# Patient Record
Sex: FEMALE | HISPANIC OR LATINO | Employment: FULL TIME | URBAN - METROPOLITAN AREA
[De-identification: names, ages, dates, MRNs, and addresses within clinical notes are randomized per-mention and may not be internally consistent; named-entity substitution may affect disease eponyms.]

---

## 2018-03-12 ENCOUNTER — OFFICE VISIT (OUTPATIENT)
Dept: RETAIL CLINIC | Facility: CLINIC | Age: 28
End: 2018-03-12

## 2018-03-12 VITALS
SYSTOLIC BLOOD PRESSURE: 135 MMHG | RESPIRATION RATE: 18 BRPM | OXYGEN SATURATION: 98 % | TEMPERATURE: 98.5 F | DIASTOLIC BLOOD PRESSURE: 85 MMHG | HEART RATE: 71 BPM

## 2018-03-12 DIAGNOSIS — H10.9 CONJUNCTIVITIS OF BOTH EYES, UNSPECIFIED CONJUNCTIVITIS TYPE: Primary | ICD-10-CM

## 2018-03-12 DIAGNOSIS — J30.89 ACUTE ALLERGIC RHINITIS DUE TO OTHER ALLERGEN, UNSPECIFIED SEASONALITY: ICD-10-CM

## 2018-03-12 PROCEDURE — 99203 OFFICE O/P NEW LOW 30 MIN: CPT | Performed by: NURSE PRACTITIONER

## 2018-03-12 RX ORDER — POLYMYXIN B SULFATE AND TRIMETHOPRIM 1; 10000 MG/ML; [USP'U]/ML
1 SOLUTION OPHTHALMIC EVERY 6 HOURS
Qty: 10 ML | Refills: 0 | Status: SHIPPED | OUTPATIENT
Start: 2018-03-12 | End: 2018-03-22

## 2018-03-12 RX ORDER — CETIRIZINE HYDROCHLORIDE 10 MG/1
10 TABLET ORAL DAILY
COMMUNITY
End: 2018-03-12 | Stop reason: ALTCHOICE

## 2018-03-12 RX ORDER — FEXOFENADINE HYDROCHLORIDE 60 MG/1
60 TABLET, FILM COATED ORAL DAILY
Qty: 30 TABLET | Refills: 0 | Status: SHIPPED | OUTPATIENT
Start: 2018-03-12 | End: 2018-04-11

## 2018-03-12 NOTE — PATIENT INSTRUCTIONS
Bacterial Conjunctivitis  Bacterial conjunctivitis is an infection of the clear membrane that covers the white part of your eye and the inner surface of your eyelid (conjunctiva). When the blood vessels in your conjunctiva become inflamed, your eye becomes red or pink, and it will probably feel itchy. Bacterial conjunctivitis spreads very easily from person to person (is contagious). It also spreads easily from one eye to the other eye.  What are the causes?  This condition is caused by several common bacteria. You may get the infection if you come into close contact with another person who is infected. You may also come into contact with items that are contaminated with the bacteria, such as a face towel, contact lens solution, or eye makeup.  What increases the risk?  This condition is more likely to develop in people who:  · Are exposed to other people who have the infection.  · Wear contact lenses.  · Have a sinus infection.  · Have had a recent eye injury or surgery.  · Have a weak body defense system (immune system).  · Have a medical condition that causes dry eyes.  What are the signs or symptoms?  Symptoms of this condition include:  · Eye redness.  · Tearing or watery eyes.  · Itchy eyes.  · Burning feeling in your eyes.  · Thick, yellowish discharge from an eye. This may turn into a crust on the eyelid overnight and cause your eyelids to stick together.  · Swollen eyelids.  · Blurred vision.  How is this diagnosed?  Your health care provider can diagnose this condition based on your symptoms and medical history. Your health care provider may also take a sample of discharge from your eye to find the cause of your infection. This is rarely done.  How is this treated?  Treatment for this condition includes:  · Antibiotic eye drops or ointment to clear the infection more quickly and prevent the spread of infection to others.  · Oral antibiotic medicines to treat infections that do not respond to drops or  ointments, or last longer than 10 days.  · Cool, wet cloths (cool compresses) placed on the eyes.  · Artificial tears applied 2-6 times a day.  Follow these instructions at home:  Medicines   · Take or apply your antibiotic medicine as told by your health care provider. Do not stop taking or applying the antibiotic even if you start to feel better.  · Take or apply over-the-counter and prescription medicines only as told by your health care provider.  · Be very careful to avoid touching the edge of your eyelid with the eye drop bottle or the ointment tube when you apply medicines to the affected eye. This will keep you from spreading the infection to your other eye or to other people.  Managing discomfort   · Gently wipe away any drainage from your eye with a warm, wet washcloth or a cotton ball.  · Apply a cool, clean washcloth to your eye for 10-20 minutes, 3-4 times a day.  General instructions   · Do not wear contact lenses until the inflammation is gone and your health care provider says it is safe to wear them again. Ask your health care provider how to sterilize or replace your contact lenses before you use them again. Wear glasses until you can resume wearing contacts.  · Avoid wearing eye makeup until the inflammation is gone. Throw away any old eye cosmetics that may be contaminated.  · Change or wash your pillowcase every day.  · Do not share towels or washcloths. This may spread the infection.  · Wash your hands often with soap and water. Use paper towels to dry your hands.  · Avoid touching or rubbing your eyes.  · Do not drive or use heavy machinery if your vision is blurred.  Contact a health care provider if:  · You have a fever.  · Your symptoms do not get better after 10 days.  Get help right away if:  · You have a fever and your symptoms suddenly get worse.  · You have severe pain when you move your eye.  · You have facial pain, redness, or swelling.  · You have sudden loss of vision.  This  information is not intended to replace advice given to you by your health care provider. Make sure you discuss any questions you have with your health care provider.  Document Released: 12/18/2006 Document Revised: 04/27/2017 Document Reviewed: 09/29/2016  [employer] Interactive Patient Education © 2017 [employer]

## 2018-03-12 NOTE — PROGRESS NOTES
Subjective   Shira Antoine is a 28 y.o. female.     Eye symptoms began 3 weeks ago but allergy symptoms are intermittent for the last few months. She was taking Claritin without symptom improvement and within the last month started taking zyrtec which doesn't seem to have helped eye or nasal symptoms either. She is a transporter in the hospital. Has vision tested 11/2017 and new glasses prescription at that time. Reports blurred vision intermittently in the left eye.       Conjunctivitis    The current episode started more than 2 weeks ago. The onset was gradual. The problem occurs continuously. The problem has been unchanged. The problem is moderate. Nothing relieves the symptoms. Exacerbated by: lubricating eye drops. Associated symptoms include decreased vision (blurry), eye itching, congestion, rhinorrhea, eye discharge and eye redness. Pertinent negatives include no fever, no double vision, no photophobia, no abdominal pain, no constipation, no diarrhea, no nausea, no vomiting, no ear discharge, no ear pain, no headaches, no hearing loss, no mouth sores, no sore throat, no stridor, no swollen glands, no muscle aches, no neck pain, no cough, no wheezing, no rash and no eye pain. There were no sick contacts. She has received no recent medical care.       The following portions of the patient's history were reviewed and updated as appropriate: allergies, current medications, past family history, past medical history, past social history, past surgical history and problem list.    Review of Systems   Constitutional: Negative for appetite change, chills, diaphoresis, fatigue and fever.   HENT: Positive for congestion, postnasal drip and rhinorrhea. Negative for ear discharge, ear pain, facial swelling, hearing loss, mouth sores, nosebleeds, sinus pressure, sneezing, sore throat, trouble swallowing and voice change.    Eyes: Positive for discharge, redness, itching and visual disturbance. Negative for double  vision, photophobia and pain.   Respiratory: Negative for cough, chest tightness, shortness of breath, wheezing and stridor.    Cardiovascular: Negative for chest pain and palpitations.   Gastrointestinal: Negative for abdominal pain, constipation, diarrhea, nausea and vomiting.   Genitourinary: Negative for decreased urine volume.   Musculoskeletal: Negative for myalgias, neck pain and neck stiffness.   Skin: Negative for rash.   Allergic/Immunologic: Positive for environmental allergies. Negative for immunocompromised state.   Neurological: Negative for dizziness, syncope, weakness and headaches.       Objective   Physical Exam   Constitutional: She is oriented to person, place, and time. She appears well-developed and well-nourished. She is cooperative.  Non-toxic appearance. She does not appear ill. No distress.   HENT:   Right Ear: Hearing, external ear and ear canal normal. Tympanic membrane is retracted. Tympanic membrane is not scarred, not perforated, not erythematous and not bulging. A middle ear effusion is present.   Left Ear: Hearing, external ear and ear canal normal. Tympanic membrane is retracted. Tympanic membrane is not scarred, not perforated, not erythematous and not bulging. A middle ear effusion is present.   Nose: Nose normal. Right sinus exhibits no maxillary sinus tenderness and no frontal sinus tenderness. Left sinus exhibits no maxillary sinus tenderness and no frontal sinus tenderness.   Mouth/Throat: Uvula is midline, oropharynx is clear and moist and mucous membranes are normal. Mucous membranes are not pale, not dry and not cyanotic. No oral lesions. No uvula swelling. No oropharyngeal exudate, posterior oropharyngeal edema or posterior oropharyngeal erythema. Tonsils are 1+ on the right. Tonsils are 1+ on the left. No tonsillar exudate.   Eyes: EOM and lids are normal. Pupils are equal, round, and reactive to light. Right eye exhibits discharge (yellow, thick, small amount). Right eye  exhibits no hordeolum. No foreign body present in the right eye. Left eye exhibits discharge (yellow, thick, small amount). Left eye exhibits no hordeolum. No foreign body present in the left eye. Right conjunctiva is not injected. Right conjunctiva has no hemorrhage. Left conjunctiva is injected. Left conjunctiva has no hemorrhage.   Visual acuity:  Both 20/10  Right 20/15  Left 20/100  Tested wearing glasses   Cardiovascular: Normal rate, regular rhythm, S1 normal and S2 normal.    Pulmonary/Chest: Effort normal and breath sounds normal.   Lymphadenopathy:     She has no cervical adenopathy.   Neurological: She is alert and oriented to person, place, and time.   Skin: Skin is warm and dry. She is not diaphoretic. No pallor.   Vitals reviewed.      Assessment/Plan   Shira was seen today for conjunctivitis.    Diagnoses and all orders for this visit:    Conjunctivitis of both eyes, unspecified conjunctivitis type  -     trimethoprim-polymyxin b (POLYTRIM) 79961-5.1 UNIT/ML-% ophthalmic solution; Administer 1 drop to both eyes Every 6 (Six) Hours for 10 days.    Acute allergic rhinitis due to other allergen, unspecified seasonality  -     fexofenadine (ALLEGRA ALLERGY) 60 MG tablet; Take 1 tablet by mouth Daily for 30 days.          -     Stop zyrtec        -     Follow up with PCP or optometrist for persistent or worsening symptoms

## 2018-11-13 ENCOUNTER — APPOINTMENT (OUTPATIENT)
Dept: CT IMAGING | Facility: HOSPITAL | Age: 28
End: 2018-11-13

## 2018-11-13 ENCOUNTER — HOSPITAL ENCOUNTER (EMERGENCY)
Facility: HOSPITAL | Age: 28
Discharge: HOME | End: 2018-11-13
Attending: EMERGENCY MEDICINE | Admitting: EMERGENCY MEDICINE

## 2018-11-13 VITALS
OXYGEN SATURATION: 99 % | HEIGHT: 64 IN | HEART RATE: 72 BPM | SYSTOLIC BLOOD PRESSURE: 139 MMHG | RESPIRATION RATE: 16 BRPM | WEIGHT: 145 LBS | DIASTOLIC BLOOD PRESSURE: 89 MMHG | TEMPERATURE: 98.9 F | BODY MASS INDEX: 24.75 KG/M2

## 2018-11-13 DIAGNOSIS — R51.9 ACUTE NONINTRACTABLE HEADACHE, UNSPECIFIED HEADACHE TYPE: ICD-10-CM

## 2018-11-13 DIAGNOSIS — R42 DIZZINESS: Primary | ICD-10-CM

## 2018-11-13 LAB
ALBUMIN SERPL-MCNC: 3.8 G/DL (ref 3.5–5.2)
ALBUMIN/GLOB SERPL: 0.8 G/DL
ALP SERPL-CCNC: 49 U/L (ref 39–117)
ALT SERPL W P-5'-P-CCNC: 12 U/L (ref 1–33)
ANION GAP SERPL CALCULATED.3IONS-SCNC: 11.2 MMOL/L
AST SERPL-CCNC: 17 U/L (ref 1–32)
BASOPHILS # BLD AUTO: 0.01 10*3/MM3 (ref 0–0.2)
BASOPHILS NFR BLD AUTO: 0.2 % (ref 0–1.5)
BILIRUB SERPL-MCNC: 0.2 MG/DL (ref 0.1–1.2)
BILIRUB UR QL STRIP: NEGATIVE
BUN BLD-MCNC: 18 MG/DL (ref 6–20)
BUN/CREAT SERPL: 14.1 (ref 7–25)
CALCIUM SPEC-SCNC: 9.3 MG/DL (ref 8.6–10.5)
CHLORIDE SERPL-SCNC: 104 MMOL/L (ref 98–107)
CLARITY UR: CLEAR
CO2 SERPL-SCNC: 25.8 MMOL/L (ref 22–29)
COLOR UR: YELLOW
CREAT BLD-MCNC: 1.28 MG/DL (ref 0.57–1)
DEPRECATED RDW RBC AUTO: 40.4 FL (ref 37–54)
EOSINOPHIL # BLD AUTO: 0.23 10*3/MM3 (ref 0–0.7)
EOSINOPHIL NFR BLD AUTO: 5.3 % (ref 0.3–6.2)
ERYTHROCYTE [DISTWIDTH] IN BLOOD BY AUTOMATED COUNT: 12.2 % (ref 11.7–13)
GFR SERPL CREATININE-BSD FRML MDRD: 49 ML/MIN/1.73
GLOBULIN UR ELPH-MCNC: 4.6 GM/DL
GLUCOSE BLD-MCNC: 86 MG/DL (ref 65–99)
GLUCOSE UR STRIP-MCNC: NEGATIVE MG/DL
HCT VFR BLD AUTO: 37.1 % (ref 35.6–45.5)
HGB BLD-MCNC: 12.2 G/DL (ref 11.9–15.5)
HGB UR QL STRIP.AUTO: NEGATIVE
HOLD SPECIMEN: NORMAL
HOLD SPECIMEN: NORMAL
IMM GRANULOCYTES # BLD: 0.01 10*3/MM3 (ref 0–0.03)
IMM GRANULOCYTES NFR BLD: 0.2 % (ref 0–0.5)
KETONES UR QL STRIP: NEGATIVE
LEUKOCYTE ESTERASE UR QL STRIP.AUTO: NEGATIVE
LYMPHOCYTES # BLD AUTO: 1.49 10*3/MM3 (ref 0.9–4.8)
LYMPHOCYTES NFR BLD AUTO: 34.6 % (ref 19.6–45.3)
MCH RBC QN AUTO: 29.8 PG (ref 26.9–32)
MCHC RBC AUTO-ENTMCNC: 32.9 G/DL (ref 32.4–36.3)
MCV RBC AUTO: 90.5 FL (ref 80.5–98.2)
MONOCYTES # BLD AUTO: 0.49 10*3/MM3 (ref 0.2–1.2)
MONOCYTES NFR BLD AUTO: 11.4 % (ref 5–12)
NEUTROPHILS # BLD AUTO: 2.09 10*3/MM3 (ref 1.9–8.1)
NEUTROPHILS NFR BLD AUTO: 48.5 % (ref 42.7–76)
NITRITE UR QL STRIP: NEGATIVE
PH UR STRIP.AUTO: 7 [PH] (ref 5–8)
PLATELET # BLD AUTO: 268 10*3/MM3 (ref 140–500)
PMV BLD AUTO: 9.6 FL (ref 6–12)
POTASSIUM BLD-SCNC: 3.8 MMOL/L (ref 3.5–5.2)
PROT SERPL-MCNC: 8.4 G/DL (ref 6–8.5)
PROT UR QL STRIP: NEGATIVE
RBC # BLD AUTO: 4.1 10*6/MM3 (ref 3.9–5.2)
SODIUM BLD-SCNC: 141 MMOL/L (ref 136–145)
SP GR UR STRIP: 1.01 (ref 1–1.03)
UROBILINOGEN UR QL STRIP: NORMAL
WBC NRBC COR # BLD: 4.31 10*3/MM3 (ref 4.5–10.7)
WHOLE BLOOD HOLD SPECIMEN: NORMAL
WHOLE BLOOD HOLD SPECIMEN: NORMAL

## 2018-11-13 PROCEDURE — 81003 URINALYSIS AUTO W/O SCOPE: CPT | Performed by: NURSE PRACTITIONER

## 2018-11-13 PROCEDURE — 25010000002 KETOROLAC TROMETHAMINE PER 15 MG: Performed by: EMERGENCY MEDICINE

## 2018-11-13 PROCEDURE — 99283 EMERGENCY DEPT VISIT LOW MDM: CPT

## 2018-11-13 PROCEDURE — 25010000002 PROCHLORPERAZINE EDISYLATE PER 10 MG: Performed by: EMERGENCY MEDICINE

## 2018-11-13 PROCEDURE — 85025 COMPLETE CBC W/AUTO DIFF WBC: CPT | Performed by: NURSE PRACTITIONER

## 2018-11-13 PROCEDURE — 96375 TX/PRO/DX INJ NEW DRUG ADDON: CPT

## 2018-11-13 PROCEDURE — 96374 THER/PROPH/DIAG INJ IV PUSH: CPT

## 2018-11-13 PROCEDURE — 70450 CT HEAD/BRAIN W/O DYE: CPT

## 2018-11-13 PROCEDURE — 80053 COMPREHEN METABOLIC PANEL: CPT | Performed by: NURSE PRACTITIONER

## 2018-11-13 PROCEDURE — 36415 COLL VENOUS BLD VENIPUNCTURE: CPT

## 2018-11-13 RX ORDER — SODIUM CHLORIDE 0.9 % (FLUSH) 0.9 %
10 SYRINGE (ML) INJECTION AS NEEDED
Status: DISCONTINUED | OUTPATIENT
Start: 2018-11-13 | End: 2018-11-13 | Stop reason: HOSPADM

## 2018-11-13 RX ORDER — KETOROLAC TROMETHAMINE 30 MG/ML
15 INJECTION, SOLUTION INTRAMUSCULAR; INTRAVENOUS ONCE
Status: COMPLETED | OUTPATIENT
Start: 2018-11-13 | End: 2018-11-13

## 2018-11-13 RX ADMIN — SODIUM CHLORIDE 1000 ML: 9 INJECTION, SOLUTION INTRAVENOUS at 18:42

## 2018-11-13 RX ADMIN — KETOROLAC TROMETHAMINE 15 MG: 30 INJECTION, SOLUTION INTRAMUSCULAR at 18:45

## 2018-11-13 RX ADMIN — PROCHLORPERAZINE EDISYLATE 10 MG: 5 INJECTION INTRAMUSCULAR; INTRAVENOUS at 18:45

## 2018-11-13 NOTE — ED TRIAGE NOTES
Pt reports dizziness, room spinning sensation, pressure behind her ears and n/v constant x1 week. States took naproxen and Dramamine, denies relief

## 2018-11-13 NOTE — ED PROVIDER NOTES
EMERGENCY DEPARTMENT ENCOUNTER    CHIEF COMPLAINT  Chief Complaint: dizziness  History given by: patient  History limited by: nothing  Room Number: 39/39  PMD: Provider, No Known      HPI:  Pt is a 28 y.o. female who presents complaining of intermittent dizziness over the last 4 days. Pt describes the dizziness as the room spinning. Pt states that her dizziness is worse with movement, light and loud noises. Pt also complains of bilateral parietal HA and N/V but denies cough, congestion, recent illness, SOA, abd pain, diarrhea or additional complaint. Pt denies having similar symptoms previously. Pt denies having a history of HAs. Pt states that she has taken Naproxen and dramamine with transient relief of her symptoms.     Duration:  4 days  Onset: gradual  Timing: intermittent  Location: generalized  Radiation: N/A  Quality: room spinning  Intensity/Severity: moderate to severe  Progression: worsening  Associated Symptoms: HA, N/V  Aggravating Factors: movement, light, loud noises  Alleviating Factors: none  Previous Episodes: Pt denies having similar symptoms previously.  Treatment before arrival:  Pt states that she has taken Naproxen and dramamine with transient relief of her symptoms.    PAST MEDICAL HISTORY  Active Ambulatory Problems     Diagnosis Date Noted   • No Active Ambulatory Problems     Resolved Ambulatory Problems     Diagnosis Date Noted   • No Resolved Ambulatory Problems     Past Medical History:   Diagnosis Date   • Allergic        PAST SURGICAL HISTORY  History reviewed. No pertinent surgical history.    FAMILY HISTORY  History reviewed. No pertinent family history.    SOCIAL HISTORY  Social History     Socioeconomic History   • Marital status: Single     Spouse name: Not on file   • Number of children: Not on file   • Years of education: Not on file   • Highest education level: Not on file   Social Needs   • Financial resource strain: Not on file   • Food insecurity - worry: Not on file   •  Food insecurity - inability: Not on file   • Transportation needs - medical: Not on file   • Transportation needs - non-medical: Not on file   Occupational History   • Not on file   Tobacco Use   • Smoking status: Former Smoker   • Smokeless tobacco: Never Used   Substance and Sexual Activity   • Alcohol use: Yes     Comment: rarely   • Drug use: No   • Sexual activity: Defer   Other Topics Concern   • Not on file   Social History Narrative   • Not on file       ALLERGIES  Patient has no known allergies.    REVIEW OF SYSTEMS  Review of Systems   Constitutional: Negative for fever.   HENT: Negative for sore throat.    Eyes: Negative.    Respiratory: Negative for cough and shortness of breath.    Cardiovascular: Negative for chest pain.   Gastrointestinal: Positive for nausea and vomiting. Negative for abdominal pain and diarrhea.   Genitourinary: Negative for dysuria.   Musculoskeletal: Negative for neck pain.   Skin: Negative for rash.   Allergic/Immunologic: Negative.    Neurological: Positive for dizziness and headaches. Negative for weakness and numbness.   Hematological: Negative.    Psychiatric/Behavioral: Negative.    All other systems reviewed and are negative.      PHYSICAL EXAM  ED Triage Vitals   Temp Heart Rate Resp BP SpO2   11/13/18 1612 11/13/18 1612 11/13/18 1659 11/13/18 1659 11/13/18 1612   98.9 °F (37.2 °C) 80 14 134/97 100 %      Temp src Heart Rate Source Patient Position BP Location FiO2 (%)   11/13/18 1612 -- 11/13/18 1659 -- --   Tympanic  Lying         Physical Exam   Constitutional: She is oriented to person, place, and time. No distress.   HENT:   Head: Normocephalic and atraumatic.   Right Ear: Tympanic membrane normal.   Left Ear: Tympanic membrane normal.   Eyes: EOM are normal. Pupils are equal, round, and reactive to light.   Neck: Normal range of motion. Neck supple.   Cardiovascular: Normal rate, regular rhythm and normal heart sounds.   Pulmonary/Chest: Effort normal and breath  sounds normal. No respiratory distress.   Abdominal: Soft. There is no tenderness. There is no rebound and no guarding.   Musculoskeletal: Normal range of motion. She exhibits no edema.   Neurological: She is alert and oriented to person, place, and time. She has normal sensation and normal strength.   Skin: Skin is warm and dry. No rash noted.   Psychiatric: Mood and affect normal.   Nursing note and vitals reviewed.      LAB RESULTS  Lab Results (last 24 hours)     Procedure Component Value Units Date/Time    CBC & Differential [[id number]] Collected:  11/13/18 1703    Specimen:  Blood Updated:  11/13/18 1720    Narrative:       The following orders were created for panel order CBC & Differential.  Procedure                               Abnormality         Status                     ---------                               -----------         ------                     CBC Auto Differential[[id number]]        Abnormal            Final result                 Please view results for these tests on the individual orders.    Comprehensive Metabolic Panel [[id number]]  (Abnormal) Collected:  11/13/18 1703    Specimen:  Blood Updated:  11/13/18 1812     Glucose 86 mg/dL      BUN 18 mg/dL      Creatinine 1.28 mg/dL      Sodium 141 mmol/L      Potassium 3.8 mmol/L      Chloride 104 mmol/L      CO2 25.8 mmol/L      Calcium 9.3 mg/dL      Total Protein 8.4 g/dL      Albumin 3.80 g/dL      ALT (SGPT) 12 U/L      AST (SGOT) 17 U/L      Alkaline Phosphatase 49 U/L      Total Bilirubin 0.2 mg/dL      eGFR Non African Amer 49 mL/min/1.73      Globulin 4.6 gm/dL      A/G Ratio 0.8 g/dL      BUN/Creatinine Ratio 14.1     Anion Gap 11.2 mmol/L     CBC Auto Differential [[id number]]  (Abnormal) Collected:  11/13/18 1703    Specimen:  Blood Updated:  11/13/18 1720     WBC 4.31 10*3/mm3      RBC 4.10 10*6/mm3      Hemoglobin 12.2 g/dL      Hematocrit 37.1 %      MCV 90.5 fL      MCH 29.8 pg      MCHC 32.9 g/dL      RDW 12.2 %      RDW-SD  "40.4 fl      MPV 9.6 fL      Platelets 268 10*3/mm3      Neutrophil % 48.5 %      Lymphocyte % 34.6 %      Monocyte % 11.4 %      Eosinophil % 5.3 %      Basophil % 0.2 %      Immature Grans % 0.2 %      Neutrophils, Absolute 2.09 10*3/mm3      Lymphocytes, Absolute 1.49 10*3/mm3      Monocytes, Absolute 0.49 10*3/mm3      Eosinophils, Absolute 0.23 10*3/mm3      Basophils, Absolute 0.01 10*3/mm3      Immature Grans, Absolute 0.01 10*3/mm3     Urinalysis With Microscopic If Indicated (No Culture) - Urine, Clean Catch [[id number]]  (Normal) Collected:  11/13/18 1902    Specimen:  Urine, Clean Catch Updated:  11/13/18 1911     Color, UA Yellow     Appearance, UA Clear     pH, UA 7.0     Specific Gravity, UA 1.008     Glucose, UA Negative     Ketones, UA Negative     Bilirubin, UA Negative     Blood, UA Negative     Protein, UA Negative     Leuk Esterase, UA Negative     Nitrite, UA Negative     Urobilinogen, UA 0.2 E.U./dL    Narrative:       Urine microscopic not indicated.          I ordered the above labs and reviewed the results    RADIOLOGY  CT Head Without Contrast   Final Result   Negative.        This report was finalized on 11/13/2018 6:00 PM by Dr. Gui Cabrera M.D.               I ordered the above noted radiological studies. Interpreted by radiologist. Reviewed by me in PACS.       PROCEDURES  Procedures      PROGRESS AND CONSULTS  ED Course as of Nov 13 2004 Tue Nov 13, 2018   1711 Pt c/o intermittent dizziness (\"room spinning\") that started about 4 days ago after she awoke from sleep. Is exacerbated by walking and is temporarily alleviated with dramamine. Reports also having nausea, vomiting, and headache. Denies focal weakness, numbness, and pain and difficulty with urination.    Documentation assistance provided by joslyn Negron for FLACO Méndez. Information recorded by the joslyn was done at my direction and has been verified and validated by me.   [MS]      ED Course User Index  [MS] " JoditristaGricelda R, APRN     1827- Notified pt of her lab and imaging results, including her negative CT Head. Notified pt that her symptoms could be due to a migraine HA. Discussed the plan to order medication for her HA. Pt understands and agrees with the plan, all questions answered.    1830- Ordered compazine and toradol for HA and IVF for hydration.    2001- Rechecked pt. Pt is resting comfortably and states that her symptoms have improved. Notified pt of her unremarkable lab and imaging results, including her negative CT Head. Discussed the plan to discharge the pt home. I instructed the pt to rest and follow up with her PCP. Pt understands and agrees with the plan, all questions answered.      MEDICAL DECISION MAKING  Results were reviewed/discussed with the patient and they were also made aware of online access. Pt also made aware that some labs, such as cultures, will not be resulted during ER visit and follow up with PMD is necessary.     MDM  Number of Diagnoses or Management Options  Acute nonintractable headache, unspecified headache type:   Dizziness:      Amount and/or Complexity of Data Reviewed  Clinical lab tests: ordered and reviewed (UA is negative)  Tests in the radiology section of CPT®: ordered and reviewed (CT head shows nothing acute)  Independent visualization of images, tracings, or specimens: yes    Patient Progress  Patient progress: stable          DIAGNOSIS  Final diagnoses:   Dizziness   Acute nonintractable headache, unspecified headache type       DISPOSITION  DISCHARGE    Patient discharged in stable condition.    Reviewed implications of results, diagnosis, meds, responsibility to follow up, warning signs and symptoms of possible worsening, potential complications and reasons to return to ER.    Patient/Family voiced understanding of above instructions.    Discussed plan for discharge, as there is no emergent indication for admission. Patient referred to primary care provider  for BP management due to today's BP. Pt/family is agreeable and understands need for follow up and repeat testing.  Pt is aware that discharge does not mean that nothing is wrong but it indicates no emergency is present that requires admission and they must continue care with follow-up as given below or physician of their choice.     FOLLOW-UP  PATIENT [facility] [id number]  [phone]  Schedule an appointment as soon as possible for a visit            Medication List      No changes were made to your prescriptions during this visit.         Latest Documented Vital Signs:  As of 8:04 PM  BP- 142/97 HR- 70 Temp- 98.9 °F (37.2 °C) (Tympanic) O2 sat- 100%    --  Documentation assistance provided by joslyn Macdonald for Dr. Teixeira.  Information recorded by the scribe was done at my direction and has been verified and validated by me.     Macey Macdonald  11/13/18 2004       Kevyn Teixeira MD  11/14/18 [id number]

## 2018-11-21 ENCOUNTER — HOSPITAL ENCOUNTER (EMERGENCY)
Facility: HOSPITAL | Age: 28
Discharge: HOME | End: 2018-11-21
Attending: EMERGENCY MEDICINE | Admitting: EMERGENCY MEDICINE

## 2018-11-21 VITALS
HEART RATE: 88 BPM | WEIGHT: 145 LBS | RESPIRATION RATE: 18 BRPM | BODY MASS INDEX: 24.75 KG/M2 | SYSTOLIC BLOOD PRESSURE: 127 MMHG | OXYGEN SATURATION: 98 % | DIASTOLIC BLOOD PRESSURE: 81 MMHG | TEMPERATURE: 98.2 F | HEIGHT: 64 IN

## 2018-11-21 DIAGNOSIS — T78.40XA ALLERGIC REACTION, INITIAL ENCOUNTER: Primary | ICD-10-CM

## 2018-11-21 DIAGNOSIS — T78.3XXA ALLERGIC ANGIOEDEMA, INITIAL ENCOUNTER: ICD-10-CM

## 2018-11-21 PROCEDURE — 96375 TX/PRO/DX INJ NEW DRUG ADDON: CPT

## 2018-11-21 PROCEDURE — 25010000002 EPINEPHRINE PER 0.1 MG: Performed by: EMERGENCY MEDICINE

## 2018-11-21 PROCEDURE — 99283 EMERGENCY DEPT VISIT LOW MDM: CPT

## 2018-11-21 PROCEDURE — 25010000002 METHYLPREDNISOLONE PER 125 MG: Performed by: EMERGENCY MEDICINE

## 2018-11-21 PROCEDURE — 63710000001 DIPHENHYDRAMINE PER 50 MG: Performed by: EMERGENCY MEDICINE

## 2018-11-21 PROCEDURE — 96372 THER/PROPH/DIAG INJ SC/IM: CPT

## 2018-11-21 PROCEDURE — 96374 THER/PROPH/DIAG INJ IV PUSH: CPT

## 2018-11-21 RX ORDER — METHYLPREDNISOLONE SODIUM SUCCINATE 125 MG/2ML
125 INJECTION, POWDER, LYOPHILIZED, FOR SOLUTION INTRAMUSCULAR; INTRAVENOUS ONCE
Status: COMPLETED | OUTPATIENT
Start: 2018-11-21 | End: 2018-11-21

## 2018-11-21 RX ORDER — EPINEPHRINE 1 MG/ML
0.3 INJECTION, SOLUTION, CONCENTRATE INTRAVENOUS ONCE
Status: COMPLETED | OUTPATIENT
Start: 2018-11-21 | End: 2018-11-21

## 2018-11-21 RX ORDER — PREDNISONE 20 MG/1
20 TABLET ORAL 2 TIMES DAILY
Qty: 10 TABLET | Refills: 0 | Status: SHIPPED | OUTPATIENT
Start: 2018-11-21 | End: 2020-03-23

## 2018-11-21 RX ORDER — DIPHENHYDRAMINE HCL 25 MG
25 CAPSULE ORAL EVERY 6 HOURS PRN
Status: DISCONTINUED | OUTPATIENT
Start: 2018-11-21 | End: 2018-11-21 | Stop reason: HOSPADM

## 2018-11-21 RX ORDER — FAMOTIDINE 10 MG/ML
20 INJECTION, SOLUTION INTRAVENOUS ONCE
Status: COMPLETED | OUTPATIENT
Start: 2018-11-21 | End: 2018-11-21

## 2018-11-21 RX ORDER — SODIUM CHLORIDE 0.9 % (FLUSH) 0.9 %
10 SYRINGE (ML) INJECTION AS NEEDED
Status: DISCONTINUED | OUTPATIENT
Start: 2018-11-21 | End: 2018-11-21 | Stop reason: HOSPADM

## 2018-11-21 RX ADMIN — EPINEPHRINE 0.3 MG: 1 INJECTION, SOLUTION, CONCENTRATE INTRAVENOUS at 18:34

## 2018-11-21 RX ADMIN — DIPHENHYDRAMINE HYDROCHLORIDE 25 MG: 25 CAPSULE ORAL at 18:28

## 2018-11-21 RX ADMIN — METHYLPREDNISOLONE SODIUM SUCCINATE 125 MG: 125 INJECTION, POWDER, FOR SOLUTION INTRAMUSCULAR; INTRAVENOUS at 18:28

## 2018-11-21 RX ADMIN — FAMOTIDINE 20 MG: 10 INJECTION, SOLUTION INTRAVENOUS at 18:29

## 2018-11-21 NOTE — ED PROVIDER NOTES
EMERGENCY DEPARTMENT ENCOUNTER    CHIEF COMPLAINT  Chief Complaint: Rash  History given by: Pt  History limited by: Nothing  Room Number: 12/12  PMD: Provider, No Known      HPI:  Pt is a 28 y.o. female with a hx of allergies who presents complaining of pruritic allergic reaction starting approximately 35 minutes PTA. Pt reports she rash initially began on her L upper arm, but has since spread to her face, chest and arms. Pt denies SOA, difficulty swallowing. She reports she has been off her allergy shots for the last month and has not been taking zyrtec for the last couple of weeks.     Duration:  35 minutes PTA  Onset: gradual  Timing: constant  Location: LUE, chest, face  Radiation: originally began on left upper arm  Quality: rash  Intensity/Severity: moderate  Progression: worsening  Associated Symptoms: facial swelling  Aggravating Factors: none  Alleviating Factors: none  Previous Episodes: Pt has a hx of allergies  Treatment before arrival: Pt reports she has not been taking zyrtec or receiving allergy shots recently    PAST MEDICAL HISTORY  Active Ambulatory Problems     Diagnosis Date Noted   • No Active Ambulatory Problems     Resolved Ambulatory Problems     Diagnosis Date Noted   • No Resolved Ambulatory Problems     Past Medical History:   Diagnosis Date   • Allergic        PAST SURGICAL HISTORY  No past surgical history on file.    FAMILY HISTORY  No family history on file.    SOCIAL HISTORY  Social History     Socioeconomic History   • Marital status: Single     Spouse name: Not on file   • Number of children: Not on file   • Years of education: Not on file   • Highest education level: Not on file   Social Needs   • Financial resource strain: Not on file   • Food insecurity - worry: Not on file   • Food insecurity - inability: Not on file   • Transportation needs - medical: Not on file   • Transportation needs - non-medical: Not on file   Occupational History   • Not on file   Tobacco Use   •  Smoking status: Former Smoker   • Smokeless tobacco: Never Used   Substance and Sexual Activity   • Alcohol use: Yes     Comment: rarely   • Drug use: No   • Sexual activity: Defer   Other Topics Concern   • Not on file   Social History Narrative   • Not on file       ALLERGIES  Patient has no known allergies.    REVIEW OF SYSTEMS  Review of Systems   Constitutional: Negative for fever.   HENT: Positive for facial swelling. Negative for sore throat and trouble swallowing.    Eyes: Negative.    Respiratory: Negative for cough and shortness of breath.    Cardiovascular: Negative for chest pain.   Gastrointestinal: Negative for abdominal pain, diarrhea and vomiting.   Genitourinary: Negative for dysuria.   Musculoskeletal: Negative for neck pain.   Skin: Positive for rash.   Allergic/Immunologic: Negative.    Neurological: Negative for weakness, numbness and headaches.   Hematological: Negative.    Psychiatric/Behavioral: Negative.    All other systems reviewed and are negative.      PHYSICAL EXAM  ED Triage Vitals   Temp Heart Rate Resp BP SpO2   11/21/18 1808 11/21/18 1808 11/21/18 1808 11/21/18 1827 11/21/18 1808   98.2 °F (36.8 °C) 92 20 (!) 129/102 100 %      Temp src Heart Rate Source Patient Position BP Location FiO2 (%)   11/21/18 1808 11/21/18 1808 11/21/18 1827 11/21/18 1827 --   Tympanic Monitor Sitting Right arm          Physical Exam   Constitutional: She is oriented to person, place, and time. No distress.   HENT:   Head: Normocephalic and atraumatic.   Mouth/Throat: Uvula swelling (mild) present.   There is angioedema of the periorbital area of her face and L lip. She is tolerating her own secretions and able to swallow.    Eyes: EOM are normal. Pupils are equal, round, and reactive to light.   Neck: Normal range of motion. Neck supple.   Cardiovascular: Normal rate, regular rhythm and normal heart sounds.   Pulmonary/Chest: Effort normal and breath sounds normal. No respiratory distress.   Abdominal:  Soft. There is no tenderness. There is no rebound and no guarding.   Musculoskeletal: Normal range of motion. She exhibits no edema.   Neurological: She is alert and oriented to person, place, and time. She has normal sensation and normal strength.   Skin: Skin is warm and dry. No rash noted.   There is urticaria on the left arm, bilateral shoulders, and anterior chest. No involvement of the legs, abdomen, or hands.    Psychiatric: Mood and affect normal.   Nursing note and vitals reviewed.        PROCEDURES  Procedures      PROGRESS AND CONSULTS       1820  Ordered Benadryl, Pepcid, Epinephrine, and solu-medrol.     1911 - Rechecked pt. Pt's hives have greatly diminished, and there is still mild angioedema around the left eye. She reports she feels better, but does report feeling a little itchy.     2025  Pt recheck. Pt's hives are gone and angioedema is down a lot. Pt is resting in bed comfortably and is ready to go home. Discussed the plan to discharge pt home with prescription for Deltasone. I instructed the pt to f/u with Patient Liaison [place].  Pt understands and agrees with plan, all concerns addressed.      MEDICAL DECISION MAKING  Results were reviewed/discussed with the patient and they were also made aware of online access. Pt also made aware that some labs, such as cultures, will not be resulted during ER visit and follow up with PMD is necessary.     MDM       DIAGNOSIS  Final diagnoses:   Allergic reaction, initial encounter   Allergic angioedema, initial encounter       DISPOSITION  DISCHARGE    Patient discharged in stable condition.    Reviewed implications of results, diagnosis, meds, responsibility to follow up, warning signs and symptoms of possible worsening, potential complications and reasons to return to ER.    Patient/Family voiced understanding of above instructions.    Discussed plan for discharge, as there is no emergent indication for admission. Patient referred to primary care  provider for BP management due to today's BP. Pt/family is agreeable and understands need for follow up and repeat testing.  Pt is aware that discharge does not mean that nothing is wrong but it indicates no emergency is present that requires admission and they must continue care with follow-up as given below or physician of their choice.     FOLLOW-UP  PATIENT LIAISON [facility] [id number]  [phone]  Schedule an appointment as soon as possible for a visit            Medication List      New Prescriptions    predniSONE 20 MG tablet  Commonly known as:  DELTASONE  Take 1 tablet by mouth 2 (Two) Times a Day.              Latest Documented Vital Signs:  As of 8:43 PM  BP- 127/81 HR- 88 Temp- 98.2 °F (36.8 °C) (Tympanic) O2 sat- 98%    --  Documentation assistance provided by joslyn Juan and Rahat Mckeon for Dr. Escobar.  Information recorded by the scribe was done at my direction and has been verified and validated by me.     Edgar Juan  11/21/18 2010       Rahat Mckeon  11/21/18 2043       Kevin Escobar MD  11/21/18 2771

## 2018-11-22 NOTE — DISCHARGE INSTRUCTIONS
Use over the counter Pepcid twice a day for the next five days.  Use prednisone as directed.  Continue the home zyrtec.  Please return to the ED if symptoms return or if you develop trouble breathing.

## 2019-06-14 ENCOUNTER — HOSPITAL ENCOUNTER (EMERGENCY)
Facility: HOSPITAL | Age: 29
Discharge: HOME | End: 2019-06-14
Attending: EMERGENCY MEDICINE | Admitting: EMERGENCY MEDICINE

## 2019-06-14 VITALS
WEIGHT: 135 LBS | DIASTOLIC BLOOD PRESSURE: 110 MMHG | RESPIRATION RATE: 16 BRPM | BODY MASS INDEX: 23.05 KG/M2 | OXYGEN SATURATION: 100 % | HEART RATE: 72 BPM | HEIGHT: 64 IN | SYSTOLIC BLOOD PRESSURE: 154 MMHG | TEMPERATURE: 98.6 F

## 2019-06-14 DIAGNOSIS — T45.0X5A ALLERGY INJECTION REACTION, INITIAL ENCOUNTER: Primary | ICD-10-CM

## 2019-06-14 DIAGNOSIS — T80.89XA ALLERGY INJECTION REACTION, INITIAL ENCOUNTER: Primary | ICD-10-CM

## 2019-06-14 PROCEDURE — 99282 EMERGENCY DEPT VISIT SF MDM: CPT

## 2019-08-06 ENCOUNTER — TRANSCRIBE ORDERS (OUTPATIENT)
Dept: OCCUPATIONAL THERAPY | Facility: CLINIC | Age: 29
End: 2019-08-06

## 2019-08-06 DIAGNOSIS — S46.911D STRAIN OF RIGHT SHOULDER, SUBSEQUENT ENCOUNTER: Primary | ICD-10-CM

## 2019-08-07 ENCOUNTER — TREATMENT (OUTPATIENT)
Dept: PHYSICAL THERAPY | Facility: CLINIC | Age: 29
End: 2019-08-07

## 2019-08-07 DIAGNOSIS — S46.911D STRAIN OF RIGHT SHOULDER, SUBSEQUENT ENCOUNTER: Primary | ICD-10-CM

## 2019-08-07 PROCEDURE — 97162 PT EVAL MOD COMPLEX 30 MIN: CPT | Performed by: PHYSICAL THERAPIST

## 2019-08-07 PROCEDURE — 97014 ELECTRIC STIMULATION THERAPY: CPT | Performed by: PHYSICAL THERAPIST

## 2019-08-07 PROCEDURE — 97110 THERAPEUTIC EXERCISES: CPT | Performed by: PHYSICAL THERAPIST

## 2019-08-07 PROCEDURE — 97140 MANUAL THERAPY 1/> REGIONS: CPT | Performed by: PHYSICAL THERAPIST

## 2019-08-07 NOTE — PROGRESS NOTES
Physical Therapy Initial Evaluation and Plan of Care    Patient: Shira Robbins   : 1990  Diagnosis/ICD-10 Code:  Strain of right shoulder, subsequent encounter [S46.911D]  Referring practitioner: MARLA Monroy    Subjective Evaluation    History of Present Illness  Date of onset: 2019  Mechanism of injury: Transferring patient from stretcher to bed - Patient slid between the bed and stretcher, had to maintain outstretched position for patient safety until bed  could be repositioned   Felt like she overstretched shoulder and noticed pain in anterior shoulder, right wrist and left knee (struck the bed frame)  Self treated over weekend  BW on following Monday - [facility]  Now referred to PT for shoulder    - 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        Patient Occupation: [employer] - [employer] - 17 months Pain  Current pain ratin  At best pain ratin  At worst pain ratin  Location: popping in the posterior shoulder, tightness in the anterior shoulder, pulling in entire shoulder   Quality: tight, dull ache, knife-like and sharp  Relieving factors: support, rest, heat and medications  Aggravating factors: outstretched reach, overhead activity and lifting (let arm hang down)  Progression: worsening    Hand dominance: left    Diagnostic Tests  No diagnostic tests performed    Treatments  Previous treatment: medication  Current treatment: medication and physical therapy  Patient Goals  Patient goal: BE able to do all of her normal activities            Objective       Postural Observations  Seated posture: fair  Standing posture: fair    Additional Postural Observation Details  Slight protraction of shoulders  Holding right arm in sling position     Palpation     Right Tenderness of the anterior deltoid, biceps, posterior deltoid, supraspinatus, triceps and upper trapezius.     Tenderness     Right Shoulder  Tenderness in the biceps tendon (proximal), bicipital groove  and supraspinatus tendon.     Cervical/Thoracic Screen   Cervical range of motion within normal limits  Thoracic range of motion within normal limits    Active Range of Motion     Right Shoulder   Flexion: 80 degrees   Extension: 60 degrees   Abduction: 112 degrees   External rotation 90°: 80 degrees  Internal rotation 90°: 80 degrees     Passive Range of Motion     Right Shoulder   Flexion: 170 degrees   Abduction: 170 degrees     Joint Play     Right Shoulder  Joints within functional limits are the anterior capsule. Hypermobile in the posterior capsule.     Strength/Myotome Testing     Right Shoulder     Planes of Motion   Flexion: 4-   Extension: 4+   Abduction: 4-   External rotation at 0°: 4   Internal rotation at 0°: 4     Additional Strength Details  Guarding with isometric testing of all shoulder movements    Tests     Right Shoulder   Positive empty can, painful arc, Speed's and sulcus sign.   Negative drop arm.     Additional Tests Details  Slight increased laxity noted with sulcus signs but also noted on the left UE         Assessment & Plan     Assessment  Impairments: abnormal muscle firing, abnormal or restricted ROM, activity intolerance, impaired physical strength, lacks appropriate home exercise program, pain with function and safety issue  Assessment details: 29 y.o. Female with right shoulder pain after transferring a heavy patient presents with: 1. Constant right shoulder pain, 2. Over immobilization of the RUE, 3. Decreased active ROM of the right shoulder, 4. Tenderness to palpation throughout the right shoulder and biceps muscle, 5. Muscle guarding prevents true strength testing but it is at least 4/5 throughout, 6. Pain with testing of Empty Can and Speeds test, 7. Reported frequent popping but none elicit today, 8. 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  Patient will have functional pain free shoulder AROM  Patient will be able to simulated patient transfers without increased symptoms  Patient will return to work with min/no restrictions        Plan  Therapy options: will be seen for skilled physical therapy services  Planned modality interventions: cryotherapy and electrical stimulation/Russian stimulation  Other planned modality interventions: Positional taping  Planned therapy interventions: manual therapy, joint mobilization, strengthening, stretching and home exercise program  Frequency: 3x week  Duration in visits: 12  Duration in weeks: 4  Treatment plan discussed with: patient  Plan details: Patient issued written HEP of exercises performed in clinic today          Manual Therapy:    14     mins  04736;  Therapeutic Exercise:    20     mins  21587;     Neuromuscular Luis:    0    mins  07149;    Therapeutic Activity:     5     mins  37538;   Probable causes of symptoms, rehab process    Evaluation Time:     25  mins  Timed Treatment:   39   mins   Total Treatment:     90   mins    PT SIGNATURE: Vane Salgado, PT   DATE TREATMENT INITIATED: 8/7/2019    Initial Certification  Certification Period: 11/5/2019  I certify that the therapy services are furnished while this patient is under my care.  The services outlined above are required by this patient, and will be reviewed every 90 days.     PHYSICIAN: Dick Brennan PA _________________________________       DATE: ________________________    Please sign and return via fax to [phone].. Thank  you, [facility].

## 2019-08-08 ENCOUNTER — TREATMENT (OUTPATIENT)
Dept: PHYSICAL THERAPY | Facility: CLINIC | Age: 29
End: 2019-08-08

## 2019-08-08 DIAGNOSIS — S46.911D STRAIN OF RIGHT SHOULDER, SUBSEQUENT ENCOUNTER: Primary | ICD-10-CM

## 2019-08-08 PROCEDURE — 97140 MANUAL THERAPY 1/> REGIONS: CPT | Performed by: PHYSICAL THERAPIST

## 2019-08-08 PROCEDURE — 97110 THERAPEUTIC EXERCISES: CPT | Performed by: PHYSICAL THERAPIST

## 2019-08-08 PROCEDURE — 97014 ELECTRIC STIMULATION THERAPY: CPT | Performed by: PHYSICAL THERAPIST

## 2019-08-08 NOTE — PROGRESS NOTES
Physical Therapy Daily Progress Note    VISIT#: 2    Subjective   Shira Robbins reports: less soreness in the shoulder in general but still painful with quick movements.  Reports compliance to HEP.      Objective   Presents with arm in more relaxed posiitoin than initial visit.     See Exercise, Manual, and Modality Logs for complete treatment.     Patient Education: Purpose of specific exercises    Assessment/Plan  Less noted guarding in the shoulder.  Still with pain and occasional popping but neither as bad as yesterday.     Progress strengthening /stabilization /functional activity           Manual Therapy:    14     mins  22905;  Therapeutic Exercise:    25/31     mins  31506;     Neuromuscular Luis:    0    mins  74096;    Therapeutic Activity:     0     mins  09617;       Timed Treatment:   39   mins   Total Treatment:     65   mins    Vane Salgado, PT  [place] License # [id number]  Physical Therapist

## 2019-08-12 ENCOUNTER — TREATMENT (OUTPATIENT)
Dept: PHYSICAL THERAPY | Facility: CLINIC | Age: 29
End: 2019-08-12

## 2019-08-12 DIAGNOSIS — S46.911D STRAIN OF RIGHT SHOULDER, SUBSEQUENT ENCOUNTER: Primary | ICD-10-CM

## 2019-08-12 PROCEDURE — 97140 MANUAL THERAPY 1/> REGIONS: CPT | Performed by: PHYSICAL THERAPIST

## 2019-08-12 PROCEDURE — 97110 THERAPEUTIC EXERCISES: CPT | Performed by: PHYSICAL THERAPIST

## 2019-08-12 PROCEDURE — 97014 ELECTRIC STIMULATION THERAPY: CPT | Performed by: PHYSICAL THERAPIST

## 2019-08-12 NOTE — PROGRESS NOTES
Physical Therapy Daily Progress Note    VISIT#: 3    Subjective   Shira Robbins reports: that she has decreased pain compared to her initial status.  States that she was able to use the arm a bit more funcioinally this weekend.       Objective   Full passive motion    Slightly painful arc in active flexion    See Exercise, Manual, and Modality Logs for complete treatment.     Patient Education: need for stabilization exercises.    Assessment/Plan  Patient with decreased pain and increased motion since initial status. Will assess for work readiness next visit.    Progress strengthening /stabilization /functional activity           Manual Therapy:    14     mins  93863;  Therapeutic Exercise:    25/35     mins  93176;     Neuromuscular Luis:    0    mins  40880;    Therapeutic Activity:     0     mins  44114;       Timed Treatment:   39   mins   Total Treatment:     90   mins    Vane Salgado, PT  [place] License # [id number]  Physical Therapist

## 2019-08-13 ENCOUNTER — TREATMENT (OUTPATIENT)
Dept: PHYSICAL THERAPY | Facility: CLINIC | Age: 29
End: 2019-08-13

## 2019-08-13 DIAGNOSIS — S46.911D STRAIN OF RIGHT SHOULDER, SUBSEQUENT ENCOUNTER: Primary | ICD-10-CM

## 2019-08-13 PROCEDURE — 97014 ELECTRIC STIMULATION THERAPY: CPT | Performed by: PHYSICAL THERAPIST

## 2019-08-13 PROCEDURE — 97530 THERAPEUTIC ACTIVITIES: CPT | Performed by: PHYSICAL THERAPIST

## 2019-08-13 PROCEDURE — 97110 THERAPEUTIC EXERCISES: CPT | Performed by: PHYSICAL THERAPIST

## 2019-08-13 NOTE — PROGRESS NOTES
MD Letter - Reassessment    Date of Initial Visit: Type: THERAPY  Noted: 8/7/2019  Today's Date: 8/13/2019  Patient seen for 4 sessions    Treatment has included: therapeutic exercise, neuromuscular re-education, manual therapy, electrical stimulation, cryotherapy and positional taping    Subjective   Shira states that she has more functional use of the arm but has some pain with quick or twisting type movements.  She reports that the pain is in the lateral subacromial space when present.  States that her pain now varies between 0-3/10 compared to 4-8/10 initially.      Objective - she presents moving her arm in fluid movement patterns compared to holding it in the sling position upon her initial visit  Shoulder AROM - full and pain free in all plane of motion  Strength - 4+/5 in flexion and abduction due to apprehension, 5/5 in all other planes  Sensation - intact  Palpation - slight tenderness to palpation in the lateral subacromial space  Special tests - slight weakness but no pain noted with both Speeds and Empty Can tests, negative Paz Slick and Sulcus sign equal to left shoulder  Activity tolerances - she is able to simulate a patient transfer from supine to sit to stand with only minimal pain in the shoulder.  She is able to push 100# without pain but had mild discomfort with pulling the 100#.     Assessment/Plan  Patient has demonstrated signficant improvement since the initiation of therapy.  The pain has  Decreased in both frequency and intensity.  The motion has returend to full.  The activity tolerances have increased but not quite to work demand level.  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  If you have any questions concerning the care, please do not hesitate to contact me.          PT Signature: Vane Salgado, PT        Manual Therapy:    0     mins  85870;  Therapeutic Exercise:    25/40      mins  65552;     Neuromuscular Luis:    0    mins  65748;    Therapeutic Activity:     12     mins  88335;   Assessed for MD and work sim    Timed Treatment:   37   mins   Total Treatment:     65   mins

## 2019-08-15 ENCOUNTER — TREATMENT (OUTPATIENT)
Dept: PHYSICAL THERAPY | Facility: CLINIC | Age: 29
End: 2019-08-15

## 2019-08-15 DIAGNOSIS — S46.911D STRAIN OF RIGHT SHOULDER, SUBSEQUENT ENCOUNTER: Primary | ICD-10-CM

## 2019-08-15 PROCEDURE — 97140 MANUAL THERAPY 1/> REGIONS: CPT | Performed by: PHYSICAL THERAPIST

## 2019-08-15 PROCEDURE — 97110 THERAPEUTIC EXERCISES: CPT | Performed by: PHYSICAL THERAPIST

## 2019-08-15 PROCEDURE — 97014 ELECTRIC STIMULATION THERAPY: CPT | Performed by: PHYSICAL THERAPIST

## 2019-08-15 NOTE — PROGRESS NOTES
Physical Therapy Daily Progress Note    VISIT#: 5    Subjective   Shira Robbins reports: that she has some tightness in the posterior capsule with stretching but no sharp pain.  Some pressure with stretching as well.       Objective   Full motion after stretching today    See Exercise, Manual, and Modality Logs for complete treatment.     Patient Education: need for scapular stabilization     Assessment/Plan  Continues to improve.  Has full motion now and increased stability as she is able to increase resistance with many exercises.     Progress strengthening /stabilization /functional activity           Manual Therapy:    12     mins  54629;  Therapeutic Exercise:     3045    mins  40454;     Neuromuscular Luis:    0    mins  10229;    Therapeutic Activity:     0     mins  89782;       Timed Treatment:   42   mins   Total Treatment:     75   mins    Vane Salgado, PT  [place] License # [id number]  Physical Therapist

## 2019-08-19 ENCOUNTER — TREATMENT (OUTPATIENT)
Dept: PHYSICAL THERAPY | Facility: CLINIC | Age: 29
End: 2019-08-19

## 2019-08-19 DIAGNOSIS — S46.911D STRAIN OF RIGHT SHOULDER, SUBSEQUENT ENCOUNTER: Primary | ICD-10-CM

## 2019-08-19 PROCEDURE — 97140 MANUAL THERAPY 1/> REGIONS: CPT | Performed by: PHYSICAL THERAPIST

## 2019-08-19 PROCEDURE — 97110 THERAPEUTIC EXERCISES: CPT | Performed by: PHYSICAL THERAPIST

## 2019-08-19 NOTE — PROGRESS NOTES
Physical Therapy Daily Progress Note    VISIT#: 6    Subjective   Shira Robbins reports: that she is having a hard time lying on either side.  States that she is over using the left arm to protect the right arm.       Objective   Full motion after stretching today    Palpable pop/click under scapular with retraction/protraction     See Exercise, Manual, and Modality Logs for complete treatment.     Patient Education: Need to continue shoulder stabilization     Assessment/Plan  Much improved activity tolerance.  Pain has decreased and her motion is now full.      Progress strengthening /stabilization /functional activity  Assess for MD next visit         Manual Therapy:    12     mins  87418;  Therapeutic Exercise:    40/50     mins  67987;     Neuromuscular Luis:    0    mins  30378;    Therapeutic Activity:     0     mins  45799;       Timed Treatment:   52   mins   Total Treatment:     65   mins    Vane Salgado, PT  [place] License # [id number]  Physical Therapist

## 2019-08-20 ENCOUNTER — TREATMENT (OUTPATIENT)
Dept: PHYSICAL THERAPY | Facility: CLINIC | Age: 29
End: 2019-08-20

## 2019-08-20 DIAGNOSIS — S46.911D STRAIN OF RIGHT SHOULDER, SUBSEQUENT ENCOUNTER: Primary | ICD-10-CM

## 2019-08-20 PROCEDURE — 97110 THERAPEUTIC EXERCISES: CPT | Performed by: PHYSICAL THERAPIST

## 2019-08-20 PROCEDURE — 97530 THERAPEUTIC ACTIVITIES: CPT | Performed by: PHYSICAL THERAPIST

## 2019-08-20 NOTE — PROGRESS NOTES
MD Letter - Discharge Summary    Date of Initial Visit: Type: THERAPY  Noted: 8/7/2019  Today's Date: 8/20/2019  Patient seen for 7 sessions    Treatment has included: therapeutic exercise, manual therapy, electrical stimulation and cryotherapy    Subjective   States that she feels much better and ready to return to her regular job duties.   States that she was able to run last night without any shoulder pain.     Objective   Shoulder AROM - full in all planes of motion  Strength - 5/5 throughout shoulder  Sensation - intact  Palpation - minimal tenderness in the biceps tendon  Special tests - Negative Speeds, Empty Can and Impingement tests  Activity tolerances - she is able to push/pull 100# and perform simulated patient transfers from bed to chair without pain.    Assessment/Plan  Patient has demonstrated significant improvement since the initiation of therapy.  The pain has  Essentially resolved.  The motion has returned to normal.  The activity tolerances have returned to work demand level.  I feel that the patient would benefit from continuing her HEP but discontinuing her formal therapy.  If you have any questions concerning the care, please do not hesitate to contact me.          PT Signature: Vane Salgado, PT        Manual Therapy:    0     mins  81205;  Therapeutic Exercise:    25/35     mins  16814;     Neuromuscular Luis:    0    mins  44638;    Therapeutic Activity:     10     mins  18882;   Assessed for MD    Timed Treatment:   35   mins   Total Treatment:     45   mins

## 2020-03-06 ENCOUNTER — APPOINTMENT (OUTPATIENT)
Dept: CT IMAGING | Facility: HOSPITAL | Age: 30
End: 2020-03-06

## 2020-03-06 ENCOUNTER — HOSPITAL ENCOUNTER (EMERGENCY)
Facility: HOSPITAL | Age: 30
Discharge: HOME | End: 2020-03-06
Attending: EMERGENCY MEDICINE | Admitting: EMERGENCY MEDICINE

## 2020-03-06 VITALS
OXYGEN SATURATION: 98 % | HEART RATE: 88 BPM | SYSTOLIC BLOOD PRESSURE: 128 MMHG | TEMPERATURE: 99.6 F | HEIGHT: 64 IN | RESPIRATION RATE: 16 BRPM | BODY MASS INDEX: 22.53 KG/M2 | WEIGHT: 132 LBS | DIASTOLIC BLOOD PRESSURE: 88 MMHG

## 2020-03-06 DIAGNOSIS — R50.9 FEBRILE ILLNESS, ACUTE: Primary | ICD-10-CM

## 2020-03-06 DIAGNOSIS — R42 DIZZINESS: ICD-10-CM

## 2020-03-06 LAB
ALBUMIN SERPL-MCNC: 4.1 G/DL (ref 3.5–5.2)
ALBUMIN/GLOB SERPL: 1.1 G/DL
ALP SERPL-CCNC: 57 U/L (ref 39–117)
ALT SERPL W P-5'-P-CCNC: 17 U/L (ref 1–33)
ANION GAP SERPL CALCULATED.3IONS-SCNC: 11.6 MMOL/L (ref 5–15)
AST SERPL-CCNC: 21 U/L (ref 1–32)
BASOPHILS # BLD AUTO: 0.02 10*3/MM3 (ref 0–0.2)
BASOPHILS NFR BLD AUTO: 0.4 % (ref 0–1.5)
BILIRUB SERPL-MCNC: 0.2 MG/DL (ref 0.2–1.2)
BUN BLD-MCNC: 11 MG/DL (ref 6–20)
BUN/CREAT SERPL: 8.1 (ref 7–25)
CALCIUM SPEC-SCNC: 8.8 MG/DL (ref 8.6–10.5)
CHLORIDE SERPL-SCNC: 103 MMOL/L (ref 98–107)
CO2 SERPL-SCNC: 22.4 MMOL/L (ref 22–29)
CREAT BLD-MCNC: 1.36 MG/DL (ref 0.57–1)
DEPRECATED RDW RBC AUTO: 39.5 FL (ref 37–54)
EOSINOPHIL # BLD AUTO: 0.34 10*3/MM3 (ref 0–0.4)
EOSINOPHIL NFR BLD AUTO: 6.3 % (ref 0.3–6.2)
ERYTHROCYTE [DISTWIDTH] IN BLOOD BY AUTOMATED COUNT: 12.2 % (ref 12.3–15.4)
FLUAV AG NPH QL: NEGATIVE
FLUBV AG NPH QL IA: NEGATIVE
GFR SERPL CREATININE-BSD FRML MDRD: 45 ML/MIN/1.73
GLOBULIN UR ELPH-MCNC: 3.8 GM/DL
GLUCOSE BLD-MCNC: 97 MG/DL (ref 65–99)
HCG SERPL QL: NEGATIVE
HCT VFR BLD AUTO: 35.9 % (ref 34–46.6)
HGB BLD-MCNC: 12.3 G/DL (ref 12–15.9)
IMM GRANULOCYTES # BLD AUTO: 0.02 10*3/MM3 (ref 0–0.05)
IMM GRANULOCYTES NFR BLD AUTO: 0.4 % (ref 0–0.5)
LYMPHOCYTES # BLD AUTO: 0.88 10*3/MM3 (ref 0.7–3.1)
LYMPHOCYTES NFR BLD AUTO: 16.3 % (ref 19.6–45.3)
MCH RBC QN AUTO: 30.1 PG (ref 26.6–33)
MCHC RBC AUTO-ENTMCNC: 34.3 G/DL (ref 31.5–35.7)
MCV RBC AUTO: 88 FL (ref 79–97)
MONOCYTES # BLD AUTO: 0.66 10*3/MM3 (ref 0.1–0.9)
MONOCYTES NFR BLD AUTO: 12.2 % (ref 5–12)
NEUTROPHILS # BLD AUTO: 3.48 10*3/MM3 (ref 1.7–7)
NEUTROPHILS NFR BLD AUTO: 64.4 % (ref 42.7–76)
NRBC BLD AUTO-RTO: 0 /100 WBC (ref 0–0.2)
PLATELET # BLD AUTO: 257 10*3/MM3 (ref 140–450)
PMV BLD AUTO: 9.6 FL (ref 6–12)
POTASSIUM BLD-SCNC: 3.5 MMOL/L (ref 3.5–5.2)
PROT SERPL-MCNC: 7.9 G/DL (ref 6–8.5)
RBC # BLD AUTO: 4.08 10*6/MM3 (ref 3.77–5.28)
SODIUM BLD-SCNC: 137 MMOL/L (ref 136–145)
WBC NRBC COR # BLD: 5.4 10*3/MM3 (ref 3.4–10.8)

## 2020-03-06 PROCEDURE — 87804 INFLUENZA ASSAY W/OPTIC: CPT | Performed by: EMERGENCY MEDICINE

## 2020-03-06 PROCEDURE — 36415 COLL VENOUS BLD VENIPUNCTURE: CPT

## 2020-03-06 PROCEDURE — 85025 COMPLETE CBC W/AUTO DIFF WBC: CPT | Performed by: EMERGENCY MEDICINE

## 2020-03-06 PROCEDURE — 99284 EMERGENCY DEPT VISIT MOD MDM: CPT

## 2020-03-06 PROCEDURE — 84703 CHORIONIC GONADOTROPIN ASSAY: CPT | Performed by: EMERGENCY MEDICINE

## 2020-03-06 PROCEDURE — 70450 CT HEAD/BRAIN W/O DYE: CPT

## 2020-03-06 PROCEDURE — 80053 COMPREHEN METABOLIC PANEL: CPT | Performed by: EMERGENCY MEDICINE

## 2020-03-06 RX ORDER — ACETAMINOPHEN 500 MG
1000 TABLET ORAL ONCE
Status: COMPLETED | OUTPATIENT
Start: 2020-03-06 | End: 2020-03-06

## 2020-03-06 RX ORDER — LAMOTRIGINE 25 MG/1
25 TABLET ORAL DAILY
COMMUNITY
End: 2020-03-06

## 2020-03-06 RX ADMIN — SODIUM CHLORIDE 1000 ML: 9 INJECTION, SOLUTION INTRAVENOUS at 21:22

## 2020-03-06 RX ADMIN — ACETAMINOPHEN 1000 MG: 500 TABLET, FILM COATED ORAL at 21:23

## 2020-03-06 NOTE — ED PROVIDER NOTES
EMERGENCY DEPARTMENT ENCOUNTER    Room Number:  13/13  Date of encounter:  3/8/2020  PCP: Provider, No Known  Historian: Patient      HPI:  Chief Complaint: Dizziness  A complete HPI/ROS/PMH/PSH/SH/FH are unobtainable due to: None    Context: Shira Kim is a 30 y.o. female who presents to the ED c/o generalized constant dizziness and disequilibrium all day today.  She says she feels like she is drunk but she has not had a drop to drink.  She also has a dull global headache this been pretty consistent but does improve with over-the-counter anti-inflammatories.    Patient had no other focal neurologic abnormalities, she has had subjective fever and chills along with a mild nonproductive cough, no nausea or vomiting, and she denies the possibility of pregnancy.    She did confide in me that she has been under a lot of stress recently including a rather traumatic break-up of a romantic relationship in the last few days.  She does suffer some anxiety and depression for which she has seen a psychiatrist and has been prescribed Lamictal which she thinks may be a contributing factor to her dizziness that she is experiencing.  It did seem to begin shortly after her first dose.      PAST MEDICAL HISTORY  Active Ambulatory Problems     Diagnosis Date Noted   • No Active Ambulatory Problems     Resolved Ambulatory Problems     Diagnosis Date Noted   • No Resolved Ambulatory Problems     Past Medical History:   Diagnosis Date   • Anxiety    • Cancer (CMS/HCC)    • Cervical cancer (CMS/HCC)          PAST SURGICAL HISTORY  History reviewed. No pertinent surgical history.      FAMILY HISTORY  History reviewed. No pertinent family history.      SOCIAL HISTORY  Social History     Socioeconomic History   • Marital status: Single     Spouse name: Not on file   • Number of children: Not on file   • Years of education: Not on file   • Highest education level: Not on file   Tobacco Use   • Smoking status: Never Smoker   •  Smokeless tobacco: Never Used   Substance and Sexual Activity   • Alcohol use: Yes     Comment: occ         ALLERGIES  Patient has no known allergies.        REVIEW OF SYSTEMS  Review of Systems     All systems reviewed and negative except for those discussed in HPI.       PHYSICAL EXAM    I have reviewed the triage vital signs and nursing notes.    ED Triage Vitals [03/06/20 1757]   Temp Heart Rate Resp BP SpO2   100 °F (37.8 °C) 87 18 132/88 99 %      Temp src Heart Rate Source Patient Position BP Location FiO2 (%)   Tympanic Monitor -- -- --       Physical Exam  GENERAL: Alert and oriented, no distress  HENT: nares patent, NCAT, neck nontender with no meningismus  EYES: no scleral icterus, A[place], EOMI  CV: regular rhythm, regular rate  RESPIRATORY: normal effort, CTA bilaterally  ABDOMEN: soft  MUSCULOSKELETAL: no deformity  NEURO: alert, moves all extremities, follows commands.  Cranial nerves II through XII grossly intact, no focal neurologic deficits are noted and there is no ataxia  SKIN: warm, dry        LAB RESULTS  No results found for this or any previous visit (from the past 24 hour(s)).    Ordered the above labs and independently reviewed the results.        RADIOLOGY  No Radiology Exams Resulted Within Past 24 Hours    I ordered the above noted radiological studies. Reviewed by me and discussed with radiologist.  See dictation for official radiology interpretation.      PROCEDURES    Procedures      MEDICATIONS GIVEN IN ER    Medications   sodium chloride 0.9 % bolus 1,000 mL (0 mL Intravenous Stopped 3/6/20 2321)   acetaminophen (TYLENOL) tablet 1,000 mg (1,000 mg Oral Given 3/6/20 2123)         PROGRESS, DATA ANALYSIS, CONSULTS, AND MEDICAL DECISION MAKING    All labs have been independently reviewed by me.  All radiology studies have been reviewed by me and discussed with radiologist dictating the report.   EKG's independently viewed and interpreted by me.  Discussion below represents my analysis of  pertinent findings related to patient's condition, differential diagnosis, treatment plan and final disposition.        ED Course as of Mar 08 0547   Sun Mar 08, 2020   0544 CBC is unremarkable, chemistry shows slightly elevated creatinine with no available comparison labs found in epic.  Her hCG is negative, influenza swab is negative, and she denies any urinary symptoms that would suggest a UTI.  She has no abdominal pain, vaginal discharge or concerns of STD exposure    [DP]   0545 CT scan of the head shows no active disease    [DP]   0545 This case presents a true chicken or the egg scenario.  She is certainly been under tremendous amount of stress, she is not been sleeping well, her blood pressure was a little elevated when she arrived, and she has a low-grade fever.    There is certainly no evidence for toxicity or sepsis.  I see no localized indications of bacterial infection, specifically no pneumonia, UTI or reason to be bacteremic.    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    [DP]   0546 I am not sure if the Lamictal is playing a role, however dizziness and disequilibrium would certainly be potential side effects of the medicine and the only way I can think of to test the hypothesis is just stopped taking it and see if things improve.    [DP]   0547 She could also just be feeling bad because she is got a febrile illness.  I advised her that it tested negative for influenza, and is likely viral and just needs to be treated symptomatically.    [DP]      ED Course User Index  [DP] Ambrosio Lin MD           AS OF 5:47 AM VITALS:    BP - 128/88  HR - 88  TEMP - 99.6 °F (37.6 °C) (Tympanic)  O2 SATS - 98%        DIAGNOSIS  Final diagnoses:   Febrile illness, acute   Dizziness         DISPOSITION  Discharge           Ambrosio Lin MD  03/08/20 0561

## 2020-03-06 NOTE — ED NOTES
"Pt c/o dizziness for 2 days. Reports \"it feels like im drunk.\" Reports starting lamotrigine 3 weeks ago and buspirone 1 week ago for bipolar and hasn't been taking them consistently. Denies heart problems.     Ada Bynum, RN  03/06/20 0430    "

## 2020-03-06 NOTE — ED NOTES
"Pt was working today and felt dizzy w/ generalized body aches. Sent to the ER for evaluation. Pt states she recently stopped taking her anxiety/psych meds in the last 3 days because her boyfriend left her and she wanted to drink alcohol. Pt states, \"I also just wanted to focus so I could get things done.\" Denies HI/SI. Reports dizziness at rest, fever present.      Deb Escobar, RN  03/06/20 0261    "

## 2020-03-21 PROBLEM — L30.9 ECZEMA: Status: ACTIVE | Noted: 2020-03-21

## 2020-03-23 ENCOUNTER — HOSPITAL ENCOUNTER (EMERGENCY)
Facility: HOSPITAL | Age: 30
Discharge: HOME | End: 2020-03-24
Attending: EMERGENCY MEDICINE | Admitting: EMERGENCY MEDICINE

## 2020-03-23 DIAGNOSIS — S70.02XA CONTUSION OF LEFT HIP, INITIAL ENCOUNTER: Primary | ICD-10-CM

## 2020-03-23 PROCEDURE — 96372 THER/PROPH/DIAG INJ SC/IM: CPT

## 2020-03-23 PROCEDURE — 99283 EMERGENCY DEPT VISIT LOW MDM: CPT

## 2020-03-23 RX ORDER — KETOROLAC TROMETHAMINE 30 MG/ML
30 INJECTION, SOLUTION INTRAMUSCULAR; INTRAVENOUS ONCE
Status: COMPLETED | OUTPATIENT
Start: 2020-03-24 | End: 2020-03-24

## 2020-03-24 ENCOUNTER — APPOINTMENT (OUTPATIENT)
Dept: GENERAL RADIOLOGY | Facility: HOSPITAL | Age: 30
End: 2020-03-24

## 2020-03-24 VITALS
DIASTOLIC BLOOD PRESSURE: 103 MMHG | TEMPERATURE: 97.9 F | BODY MASS INDEX: 22.66 KG/M2 | SYSTOLIC BLOOD PRESSURE: 143 MMHG | HEIGHT: 64 IN | HEART RATE: 60 BPM | RESPIRATION RATE: 16 BRPM | OXYGEN SATURATION: 99 %

## 2020-03-24 PROCEDURE — 25010000002 KETOROLAC TROMETHAMINE PER 15 MG: Performed by: PHYSICIAN ASSISTANT

## 2020-03-24 PROCEDURE — 96372 THER/PROPH/DIAG INJ SC/IM: CPT

## 2020-03-24 PROCEDURE — 73502 X-RAY EXAM HIP UNI 2-3 VIEWS: CPT

## 2020-03-24 RX ORDER — IBUPROFEN 600 MG/1
600 TABLET ORAL EVERY 6 HOURS PRN
Qty: 21 TABLET | Refills: 0 | Status: SHIPPED | OUTPATIENT
Start: 2020-03-24 | End: 2020-05-04

## 2020-03-24 RX ADMIN — KETOROLAC TROMETHAMINE 30 MG: 30 INJECTION, SOLUTION INTRAMUSCULAR at 00:19

## 2020-10-28 ENCOUNTER — OFFICE VISIT (OUTPATIENT)
Dept: FAMILY MEDICINE CLINIC | Facility: CLINIC | Age: 30
End: 2020-10-28

## 2020-10-28 VITALS
HEART RATE: 71 BPM | BODY MASS INDEX: 29.37 KG/M2 | DIASTOLIC BLOOD PRESSURE: 86 MMHG | TEMPERATURE: 98.6 F | SYSTOLIC BLOOD PRESSURE: 130 MMHG | OXYGEN SATURATION: 99 % | HEIGHT: 64 IN | WEIGHT: 172 LBS

## 2020-10-28 DIAGNOSIS — F41.9 ANXIETY: ICD-10-CM

## 2020-10-28 DIAGNOSIS — K90.0 CELIAC DISEASE: Primary | ICD-10-CM

## 2020-10-28 DIAGNOSIS — L20.84 INTRINSIC ECZEMA: ICD-10-CM

## 2020-10-28 PROCEDURE — 99204 OFFICE O/P NEW MOD 45 MIN: CPT | Performed by: FAMILY MEDICINE

## 2020-10-28 RX ORDER — VENLAFAXINE HYDROCHLORIDE 75 MG/1
75 CAPSULE, EXTENDED RELEASE ORAL DAILY
Qty: 90 CAPSULE | Refills: 1 | Status: SHIPPED | OUTPATIENT
Start: 2020-10-28 | End: 2021-10-05 | Stop reason: SDUPTHER

## 2020-10-28 RX ORDER — CETIRIZINE HYDROCHLORIDE 10 MG/1
10 TABLET ORAL DAILY
COMMUNITY
End: 2022-01-12 | Stop reason: SDUPTHER

## 2020-10-28 NOTE — PROGRESS NOTES
Subjective   Shira Robbins is a 30 y.o. female.     Chief Complaint   Patient presents with   • Establish Care     need PCP--had lesions on her neck and back   • ADD   • Anxiety       History of Present Illness   Anxiety- worse with work stress, since pandemic started. Has never been on meds. No depressed moods. Some panic attacks. Pt declines counseling. Has tried it in the past. Grinds her teeth and uses mouth guards.     ADD- has seen psych. She was not diagnosed with this but pt is wondering if she has this as she is often fatigued and drinks energy drinks.     Eczema-  Stable on lotion    Celiac- diet controlled, has stomach carmping. Has skin lesions with this. Needs derm f/u. On neck and back.     The following portions of the patient's history were reviewed and updated as appropriate: allergies, current medications, past family history, past medical history, past social history, past surgical history and problem list.    Past Medical History:   Diagnosis Date   • Allergic    • Anxiety    • Cancer (CMS/HCC)    • Cervical cancer (CMS/HCC)     in remission        Past Surgical History:   Procedure Laterality Date   • DILATATION AND CURETTAGE     • EAR RECONSTRUCTION     • TONSILLECTOMY     • WISDOM TOOTH EXTRACTION         Family History   Problem Relation Age of Onset   • Heart disease Father    • Hyperlipidemia Father    • Breast cancer Maternal Grandmother        Social History     Socioeconomic History   • Marital status: Single     Spouse name: Not on file   • Number of children: Not on file   • Years of education: Not on file   • Highest education level: Not on file   Tobacco Use   • Smoking status: Never Smoker   • Smokeless tobacco: Never Used   Substance and Sexual Activity   • Alcohol use: Yes     Comment: occ   • Drug use: No   • Sexual activity: Defer   Social History Narrative    ** Merged History Encounter **            Review of Systems   Constitutional: Negative for fatigue and fever.  "  HENT: Negative for ear pain and hearing loss.    Eyes: Negative for pain and visual disturbance.   Respiratory: Negative for chest tightness and shortness of breath.    Cardiovascular: Negative for chest pain and palpitations.   Gastrointestinal: Negative for constipation and diarrhea.   Genitourinary: Negative for dysuria and urinary incontinence.   Musculoskeletal: Negative for arthralgias and myalgias.   Skin: Negative for rash and skin lesions.   Neurological: Negative for headache and memory problem.   Psychiatric/Behavioral: Negative for suicidal ideas and depressed mood. The patient is nervous/anxious.        Objective   Visit Vitals  /86 (BP Location: Right arm, Patient Position: Sitting, Cuff Size: Adult)   Pulse 71   Temp 98.6 °F (37 °C) (Infrared)   Ht 162.6 cm (64\")   Wt 78 kg (172 lb)   SpO2 99%   BMI 29.52 kg/m²     Body mass index is 29.52 kg/m².  Physical Exam  Constitutional:       General: She is not in acute distress.     Appearance: She is well-developed.   HENT:      Head: Normocephalic and atraumatic.   Eyes:      Conjunctiva/sclera: Conjunctivae normal.      Pupils: Pupils are equal, round, and reactive to light.   Neck:      Musculoskeletal: Normal range of motion and neck supple.   Cardiovascular:      Rate and Rhythm: Normal rate and regular rhythm.      Heart sounds: Normal heart sounds.   Pulmonary:      Effort: Pulmonary effort is normal.      Breath sounds: Normal breath sounds.   Abdominal:      General: Bowel sounds are normal.      Palpations: Abdomen is soft.   Musculoskeletal: Normal range of motion.   Skin:     General: Skin is warm and dry.      Findings: No rash.      Comments: Some darkened skin on spots on her neck   Neurological:      General: No focal deficit present.      Mental Status: She is alert and oriented to person, place, and time.   Psychiatric:         Mood and Affect: Mood normal.         Behavior: Behavior normal.           Assessment/Plan   Diagnoses " and all orders for this visit:    1. Celiac disease (Primary)  -     Ambulatory Referral to Dermatology    2. Anxiety  -     venlafaxine XR (Effexor XR) 75 MG 24 hr capsule; Take 1 capsule by mouth Daily.  Dispense: 90 capsule; Refill: 1    3. Intrinsic eczema  -     Ambulatory Referral to Dermatology        Discussed risks and benefits of medication and f/u in 1 month.

## 2020-10-29 ENCOUNTER — TELEMEDICINE (OUTPATIENT)
Dept: FAMILY MEDICINE CLINIC | Facility: CLINIC | Age: 30
End: 2020-10-29

## 2020-10-29 DIAGNOSIS — H10.32 ACUTE BACTERIAL CONJUNCTIVITIS OF LEFT EYE: Primary | ICD-10-CM

## 2020-10-29 PROCEDURE — 99213 OFFICE O/P EST LOW 20 MIN: CPT | Performed by: FAMILY MEDICINE

## 2020-10-29 RX ORDER — TOBRAMYCIN 3 MG/ML
2 SOLUTION/ DROPS OPHTHALMIC
Qty: 5 ML | Refills: 0 | Status: SHIPPED | OUTPATIENT
Start: 2020-10-29 | End: 2021-01-06 | Stop reason: SDUPTHER

## 2020-10-29 NOTE — PROGRESS NOTES
CC: left eye infected for 2 days.      Subjective   Shira Robbins is a 30 y.o. female.     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  She wants something different.          The following portions of the patient's history were reviewed and updated as appropriate: allergies, current medications, past family history, past medical history, past social history, past surgical history and problem list.    Review of Systems   Constitutional: Negative for fatigue.   Eyes: Negative for blurred vision.   Respiratory: Negative for cough, chest tightness and shortness of breath.    Cardiovascular: Negative for chest pain, palpitations and leg swelling.   Neurological: Negative for dizziness, light-headedness and headache.         Patient Active Problem List   Diagnosis   • Eczema   • Anxiety   • Celiac disease   • Acute bacterial conjunctivitis of left eye       No Known Allergies      Current Outpatient Medications:   •  cetirizine (zyrTEC) 10 MG tablet, Take 10 mg by mouth Daily., Disp: , Rfl:   •  erythromycin (ROMYCIN) 5 MG/GM ophthalmic ointment, Apply a 1/2 inch ribbon to left eye 4 times a day for 7 days, Disp: 3.5 g, Rfl: 0  •  tobramycin (Tobrex) 0.3 % solution ophthalmic solution, Administer 2 drops into the left eye Every 4 (Four) Hours., Disp: 5 mL, Rfl: 0  •  venlafaxine XR (Effexor XR) 75 MG 24 hr capsule, Take 1 capsule by mouth Daily., Disp: 90 capsule, Rfl: 1    Past Medical History:   Diagnosis Date   • Allergic    • Anxiety    • Cancer (CMS/HCC)    • Cervical cancer (CMS/HCC)     in remission        Past Surgical History:   Procedure Laterality Date   • DILATATION AND CURETTAGE     • EAR RECONSTRUCTION     • TONSILLECTOMY     • WISDOM TOOTH EXTRACTION          Family History   Problem Relation Age of Onset   • Heart disease Father    • Hyperlipidemia Father    • Breast cancer Maternal Grandmother        Social History     Tobacco Use   • Smoking status: Never Smoker   • Smokeless tobacco: Never Used   Substance Use Topics   • Alcohol use: Yes     Comment: occ            Objective     There were no vitals taken for this visit. Video Visit    Physical Exam  NAD  AAOx3  No labored breathing.    Lab Results   Component Value Date    GLUCOSE 97 03/06/2020    BUN 11 03/06/2020    CREATININE 1.36 (H) 03/06/2020    EGFRIFNONA 45 (L) 03/06/2020    BCR 8.1 03/06/2020    K 3.5 03/06/2020    CO2 22.4 03/06/2020    CALCIUM 8.8 03/06/2020    ALBUMIN 4.10 03/06/2020    AST 21 03/06/2020    ALT 17 03/06/2020       WBC   Date Value Ref Range Status   03/06/2020 5.40 3.40 - 10.80 10*3/mm3 Final     RBC   Date Value Ref Range Status   03/06/2020 4.08 3.77 - 5.28 10*6/mm3 Final     Hemoglobin   Date Value Ref Range Status   03/06/2020 12.3 12.0 - 15.9 g/dL Final     Hematocrit   Date Value Ref Range Status   03/06/2020 35.9 34.0 - 46.6 % Final     MCV   Date Value Ref Range Status   03/06/2020 88.0 79.0 - 97.0 fL Final     MCH   Date Value Ref Range Status   03/06/2020 30.1 26.6 - 33.0 pg Final     MCHC   Date Value Ref Range Status   03/06/2020 34.3 31.5 - 35.7 g/dL Final     RDW   Date Value Ref Range Status   03/06/2020 12.2 (L) 12.3 - 15.4 % Final     RDW-SD   Date Value Ref Range Status   03/06/2020 39.5 37.0 - 54.0 fl Final     MPV   Date Value Ref Range Status   03/06/2020 9.6 6.0 - 12.0 fL Final     Platelets   Date Value Ref Range Status   03/06/2020 257 140 - 450 10*3/mm3 Final     Neutrophil %   Date Value Ref Range Status   03/06/2020 64.4 42.7 - 76.0 % Final     Lymphocyte %   Date Value Ref Range Status   03/06/2020 16.3 (L) 19.6 - 45.3 % Final     Monocyte %   Date Value Ref Range Status   03/06/2020 12.2 (H) 5.0 - 12.0 % Final     Eosinophil %   Date Value Ref Range  Status   03/06/2020 6.3 (H) 0.3 - 6.2 % Final     Basophil %   Date Value Ref Range Status   03/06/2020 0.4 0.0 - 1.5 % Final     Immature Grans %   Date Value Ref Range Status   03/06/2020 0.4 0.0 - 0.5 % Final     Neutrophils, Absolute   Date Value Ref Range Status   03/06/2020 3.48 1.70 - 7.00 10*3/mm3 Final     Lymphocytes, Absolute   Date Value Ref Range Status   03/06/2020 0.88 0.70 - 3.10 10*3/mm3 Final     Monocytes, Absolute   Date Value Ref Range Status   03/06/2020 0.66 0.10 - 0.90 10*3/mm3 Final     Eosinophils, Absolute   Date Value Ref Range Status   03/06/2020 0.34 0.00 - 0.40 10*3/mm3 Final     Basophils, Absolute   Date Value Ref Range Status   03/06/2020 0.02 0.00 - 0.20 10*3/mm3 Final     Immature Grans, Absolute   Date Value Ref Range Status   03/06/2020 0.02 0.00 - 0.05 10*3/mm3 Final     nRBC   Date Value Ref Range Status   03/06/2020 0.0 0.0 - 0.2 /100 WBC Final       No results found for: HGBA1C    No results found for: EMFXBIJT02    No results found for: TSH    No results found for: CHOL  No results found for: TRIG  No results found for: HDL  No results found for: LDL  No results found for: VLDL  No results found for: LDLHDL      Procedures    Assessment/Plan   Problems Addressed this Visit        Other    Acute bacterial conjunctivitis of left eye - Primary    Relevant Medications    tobramycin (Tobrex) 0.3 % solution ophthalmic solution      Diagnoses       Codes Comments    Acute bacterial conjunctivitis of left eye    -  Primary ICD-10-CM: H10.32  ICD-9-CM: 372.03           No orders of the defined types were placed in this encounter.      Current Outpatient Medications   Medication Sig Dispense Refill   • cetirizine (zyrTEC) 10 MG tablet Take 10 mg by mouth Daily.     • erythromycin (ROMYCIN) 5 MG/GM ophthalmic ointment Apply a 1/2 inch ribbon to left eye 4 times a day for 7 days 3.5 g 0   • tobramycin (Tobrex) 0.3 % solution ophthalmic solution Administer 2 drops into the left eye  Every 4 (Four) Hours. 5 mL 0   • venlafaxine XR (Effexor XR) 75 MG 24 hr capsule Take 1 capsule by mouth Daily. 90 capsule 1     No current facility-administered medications for this visit.        No follow-ups on file.    There are no Patient Instructions on file for this visit.     wash hands regularly.  Will switch to drops. Symptomatic treatment and otc meds and fluids and rest.  Follow up if no better.      Time spent on visit:  11   minutes.  This patient has consented to a telehealth visit via video. The visit was scheduled as a video visit to comply with patient safety concerns in accordance with CDC recommendations.  All vitals recorded within this visit are reported by the patient.

## 2021-01-01 ENCOUNTER — IMMUNIZATION (OUTPATIENT)
Dept: VACCINE CLINIC | Facility: HOSPITAL | Age: 31
End: 2021-01-01

## 2021-01-01 PROCEDURE — 91300 HC SARSCOV02 VAC 30MCG/0.3ML IM: CPT | Performed by: INTERNAL MEDICINE

## 2021-01-01 PROCEDURE — 0001A: CPT | Performed by: INTERNAL MEDICINE

## 2021-01-06 DIAGNOSIS — H10.32 ACUTE BACTERIAL CONJUNCTIVITIS OF LEFT EYE: ICD-10-CM

## 2021-01-06 DIAGNOSIS — F41.9 ANXIETY: ICD-10-CM

## 2021-01-07 RX ORDER — TOBRAMYCIN 3 MG/ML
2 SOLUTION/ DROPS OPHTHALMIC
Qty: 5 ML | Refills: 0 | Status: SHIPPED | OUTPATIENT
Start: 2021-01-07 | End: 2022-03-01 | Stop reason: HOSPADM

## 2021-01-11 RX ORDER — VENLAFAXINE HYDROCHLORIDE 75 MG/1
75 CAPSULE, EXTENDED RELEASE ORAL DAILY
Qty: 90 CAPSULE | Refills: 1 | OUTPATIENT
Start: 2021-01-11

## 2021-01-11 NOTE — TELEPHONE ENCOUNTER
Sent message through [employer]. Patient should have refill.  Was sent in Oct for 6 month supply.

## 2021-01-22 ENCOUNTER — IMMUNIZATION (OUTPATIENT)
Dept: VACCINE CLINIC | Facility: HOSPITAL | Age: 31
End: 2021-01-22

## 2021-01-22 PROCEDURE — 91300 HC SARSCOV02 VAC 30MCG/0.3ML IM: CPT | Performed by: INTERNAL MEDICINE

## 2021-01-22 PROCEDURE — 0002A: CPT | Performed by: INTERNAL MEDICINE

## 2021-02-03 ENCOUNTER — OFFICE VISIT (OUTPATIENT)
Dept: FAMILY MEDICINE CLINIC | Facility: CLINIC | Age: 31
End: 2021-02-03

## 2021-02-03 VITALS
HEART RATE: 80 BPM | DIASTOLIC BLOOD PRESSURE: 90 MMHG | BODY MASS INDEX: 29.02 KG/M2 | SYSTOLIC BLOOD PRESSURE: 132 MMHG | OXYGEN SATURATION: 98 % | HEIGHT: 64 IN | TEMPERATURE: 98.6 F | WEIGHT: 170 LBS

## 2021-02-03 DIAGNOSIS — F41.9 ANXIETY: Primary | ICD-10-CM

## 2021-02-03 DIAGNOSIS — M94.0 COSTOCHONDRITIS: ICD-10-CM

## 2021-02-03 DIAGNOSIS — K90.0 CELIAC DISEASE: ICD-10-CM

## 2021-02-03 PROCEDURE — 99214 OFFICE O/P EST MOD 30 MIN: CPT | Performed by: FAMILY MEDICINE

## 2021-02-03 RX ORDER — DICYCLOMINE HYDROCHLORIDE 10 MG/1
10 CAPSULE ORAL
Qty: 450 CAPSULE | Refills: 1 | Status: SHIPPED | OUTPATIENT
Start: 2021-02-03 | End: 2022-01-12

## 2021-02-03 RX ORDER — MELOXICAM 15 MG/1
15 TABLET ORAL DAILY
Qty: 90 TABLET | Refills: 1 | Status: SHIPPED | OUTPATIENT
Start: 2021-02-03 | End: 2022-01-12
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
## 2021-02-04 ENCOUNTER — TELEPHONE (OUTPATIENT)
Dept: FAMILY MEDICINE CLINIC | Facility: CLINIC | Age: 31
End: 2021-02-04

## 2021-02-04 NOTE — TELEPHONE ENCOUNTER
Patient called in wanting to know if she can drop off the ANA Paper Work she didn't have for her her visit yesterday for the doctor to fill out?    Best call back # [phone]

## 2021-03-01 DIAGNOSIS — H10.32 ACUTE BACTERIAL CONJUNCTIVITIS OF LEFT EYE: ICD-10-CM

## 2021-03-01 RX ORDER — TOBRAMYCIN 3 MG/ML
2 SOLUTION/ DROPS OPHTHALMIC
Qty: 5 ML | Refills: 0 | OUTPATIENT
Start: 2021-03-01

## 2021-04-09 ENCOUNTER — OFFICE VISIT (OUTPATIENT)
Dept: SLEEP MEDICINE | Facility: HOSPITAL | Age: 31
End: 2021-04-09

## 2021-04-09 VITALS
HEART RATE: 91 BPM | HEIGHT: 64 IN | WEIGHT: 167 LBS | BODY MASS INDEX: 28.51 KG/M2 | SYSTOLIC BLOOD PRESSURE: 141 MMHG | DIASTOLIC BLOOD PRESSURE: 103 MMHG | OXYGEN SATURATION: 99 %

## 2021-04-09 DIAGNOSIS — R06.81 WITNESSED EPISODE OF APNEA: ICD-10-CM

## 2021-04-09 DIAGNOSIS — R06.83 SNORING: ICD-10-CM

## 2021-04-09 DIAGNOSIS — Z98.890 HISTORY OF TRACHEOSTOMY: ICD-10-CM

## 2021-04-09 DIAGNOSIS — G47.10 HYPERSOMNIA: Primary | ICD-10-CM
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
## 2021-04-21 DIAGNOSIS — M94.0 COSTOCHONDRITIS: Primary | ICD-10-CM

## 2021-04-21 RX ORDER — TRAMADOL HYDROCHLORIDE 50 MG/1
50 TABLET ORAL EVERY 8 HOURS PRN
Qty: 90 TABLET | Refills: 0 | Status: SHIPPED | OUTPATIENT
Start: 2021-04-21 | End: 2021-10-05 | Stop reason: SDUPTHER

## 2021-05-01 DIAGNOSIS — F41.9 ANXIETY: ICD-10-CM

## 2021-05-03 ENCOUNTER — HOSPITAL ENCOUNTER (OUTPATIENT)
Dept: SLEEP MEDICINE | Facility: HOSPITAL | Age: 31
End: 2021-05-03

## 2021-05-03 RX ORDER — VENLAFAXINE HYDROCHLORIDE 75 MG/1
CAPSULE, EXTENDED RELEASE ORAL
Qty: 90 CAPSULE | Refills: 1 | OUTPATIENT
Start: 2021-05-03

## 2021-05-14 ENCOUNTER — HOSPITAL ENCOUNTER (OUTPATIENT)
Dept: SLEEP MEDICINE | Facility: HOSPITAL | Age: 31
Discharge: HOME | End: 2021-05-14
Admitting: NURSE PRACTITIONER

## 2021-05-14 DIAGNOSIS — R06.81 WITNESSED EPISODE OF APNEA: ICD-10-CM

## 2021-05-14 DIAGNOSIS — R06.83 SNORING: ICD-10-CM

## 2021-05-14 DIAGNOSIS — Z98.890 HISTORY OF TRACHEOSTOMY: ICD-10-CM

## 2021-05-14 DIAGNOSIS — G47.10 HYPERSOMNIA: ICD-10-CM

## 2021-05-14 PROCEDURE — 95806 SLEEP STUDY UNATT&RESP EFFT: CPT

## 2021-05-26 ENCOUNTER — OFFICE VISIT (OUTPATIENT)
Dept: SLEEP MEDICINE | Facility: HOSPITAL | Age: 31
End: 2021-05-26

## 2021-05-26 VITALS
BODY MASS INDEX: 28.17 KG/M2 | WEIGHT: 165 LBS | OXYGEN SATURATION: 98 % | HEART RATE: 92 BPM | HEIGHT: 64 IN | SYSTOLIC BLOOD PRESSURE: 135 MMHG | DIASTOLIC BLOOD PRESSURE: 79 MMHG

## 2021-05-26 DIAGNOSIS — G47.33 OSA (OBSTRUCTIVE SLEEP APNEA): Primary | ICD-10-CM

## 2021-05-26 DIAGNOSIS — E66.3 OVERWEIGHT (BMI 25.0-29.9): ICD-10-CM

## 2021-05-26 PROCEDURE — G0463 HOSPITAL OUTPT CLINIC VISIT: HCPCS

## 2021-05-26 NOTE — PROGRESS NOTES
[facility]  Telephone: [phone] / Fax: [phone] [place]  Telephone: [phone] / Fax: [phone] Leyda Looney    PCP: Marianela Morin MD    Reason for visit: discuss sleep study results.    Shira Robbins is a 31 y.o.female  was last seen at [facility] sleep lab in April 2021. In view of hypersomnia/snoring, we did a home sleep study. Pt is here today to discuss results. She did the study twice. First night results were inconclusive as monitor was going on/off. It showed ABEBE of 19.6 with lowest desaturation to 87%. Second night, she slept only 3-4 hours, but AHI was 46.9 and lowest desaturation was 60%. Study is pending official interpretation by MD. Pt continues to report sleepiness. She works alternating shifts in the ER. She c/o loud snoring/apneas. She is reluctant to try OMAD due to bruxism.    SH- no tobacco, drinks 5 alc per week.    ROS neg      Current Medications:    Current Outpatient Medications:   •  cetirizine (zyrTEC) 10 MG tablet, Take 10 mg by mouth Daily., Disp: , Rfl:   •  dicyclomine (Bentyl) 10 MG capsule, Take 1 capsule by mouth 4 (Four) Times a Day Before Meals & at Bedtime., Disp: 450 capsule, Rfl: 1  •  erythromycin (ROMYCIN) 5 MG/GM ophthalmic ointment, Apply a 1/2 inch ribbon to left eye 4 times a day for 7 days, Disp: 3.5 g, Rfl: 0  •  meloxicam (Mobic) 15 MG tablet, Take 1 tablet by mouth Daily., Disp: 90 tablet, Rfl: 1  •  tobramycin (Tobrex) 0.3 % solution ophthalmic solution, Administer 2 drops into the left eye Every 4 (Four) Hours., Disp: 5 mL, Rfl: 0  •  tobramycin 0.3 % solution ophthalmic solution, Administer 2 drops into the left eye Every 4 (Four) Hours., Disp: 5 mL, Rfl: 1  •  traMADol (ULTRAM) 50 MG tablet, Take 1 tablet by mouth Every 8 (Eight) Hours As Needed for Moderate Pain ., Disp: 90 tablet, Rfl: 0  •  venlafaxine XR (Effexor XR) 75 MG 24 hr capsule, Take 1 capsule by mouth Daily., Disp: 90 capsule, Rfl: 1   also entered in Sleep  "Questionnaire    Patient  has a past medical history of Allergic, Anxiety, Cancer (CMS/HCC), and Cervical cancer (CMS/[facility]).    I have reviewed the Past Medical History, Past Surgical History, Social History and Family History.    Vital Signs /79   Pulse 92   Ht 162.6 cm (64.02\")   Wt 74.8 kg (165 lb)   SpO2 98%   BMI 28.31 kg/m²  Body mass index is 28.31 kg/m².    General Alert and oriented. No acute distress noted   Pharynx/Throat Class  Mallampati airway, large tongue, no evidence of redundant lateral pharyngeal tissue. No oral lesions. No thrush. Moist mucous membranes.   Head Normocephalic. Symmetrical. Atraumatic.    Nose No septal deviation. No drainage   Chest Wall Normal shape. Symmetric expansion with respiration. No tenderness.   Neck Trachea midline, no thyromegaly or adenopathy    Lungs Clear to auscultation bilaterally. No wheezes. No rhonchi. No rales. Respirations regular, even and unlabored.   Heart Regular rhythm and normal rate. Normal S1 and S2. No murmur   Abdomen Soft, non-tender and non-distended. Normal bowel sounds. No masses.   Extremities Moves all extremities well. No edema   Psychiatric Normal mood and affect.       Testing:  Study-night 1 AHI 19.6(preliminary)  Study night 2 AHI 46.9(preliminary)    Impression:  1. YASMINE (obstructive sleep apnea)    2. Overweight (BMI 25.0-29.9)        Plan  I reviewed YASMINE pathophysiology and sleep study results with patient. We discussed adverse outcomes associated with untreated YASMINE. I explained to patient how CPAP works to correct YASMINE and how OMAD works as well. After our discussion patient agreed to initiate tx with auto CPAP. I will ask our staff to set up auto CPAP 5-20 cm through local DME. I will order overnight oximetry on CPAP RA to be done few weeks after set up.    Patient will return for f/u in 2-3 months to see Dr. Duran. Patient was advised to call if does not hear from DME in next 7-10 days. I asked patient to use the machine at " least 4 hours on at least 70% of the nights. Patient was advised to contact DME if he/she experiences any mask issues in first 30 days.      I appreciate the opportunity to participate in this patient's care.      FLACO Kline  [facility]  Phone: [phone]

## 2021-07-29 DIAGNOSIS — M94.0 COSTOCHONDRITIS: ICD-10-CM

## 2021-07-30 RX ORDER — MELOXICAM 15 MG/1
TABLET ORAL
Qty: 90 TABLET | Refills: 1 | OUTPATIENT
Start: 2021-07-30

## 2021-08-06 ENCOUNTER — OFFICE VISIT (OUTPATIENT)
Dept: SLEEP MEDICINE | Facility: HOSPITAL | Age: 31
End: 2021-08-06

## 2021-08-06 VITALS
BODY MASS INDEX: 28.34 KG/M2 | DIASTOLIC BLOOD PRESSURE: 95 MMHG | HEIGHT: 64 IN | OXYGEN SATURATION: 99 % | SYSTOLIC BLOOD PRESSURE: 140 MMHG | HEART RATE: 89 BPM | WEIGHT: 166 LBS

## 2021-08-06 DIAGNOSIS — G47.33 OBSTRUCTIVE SLEEP APNEA: Primary | ICD-10-CM

## 2021-08-06 DIAGNOSIS — Z87.828 HISTORY OF MOTOR VEHICLE ACCIDENT: ICD-10-CM

## 2021-08-06 PROCEDURE — G0463 HOSPITAL OUTPT CLINIC VISIT: HCPCS
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
## 2021-08-31 DIAGNOSIS — F41.9 ANXIETY: ICD-10-CM

## 2021-08-31 DIAGNOSIS — M94.0 COSTOCHONDRITIS: ICD-10-CM

## 2021-09-01 RX ORDER — VENLAFAXINE HYDROCHLORIDE 75 MG/1
75 CAPSULE, EXTENDED RELEASE ORAL DAILY
Qty: 90 CAPSULE | Refills: 1 | OUTPATIENT
Start: 2021-09-01

## 2021-09-01 RX ORDER — TRAMADOL HYDROCHLORIDE 50 MG/1
50 TABLET ORAL EVERY 8 HOURS PRN
Qty: 90 TABLET | Refills: 0 | OUTPATIENT
Start: 2021-09-01

## 2021-09-21 DIAGNOSIS — M94.0 COSTOCHONDRITIS: ICD-10-CM

## 2021-09-21 DIAGNOSIS — F41.9 ANXIETY: ICD-10-CM

## 2021-09-21 RX ORDER — VENLAFAXINE HYDROCHLORIDE 75 MG/1
75 CAPSULE, EXTENDED RELEASE ORAL DAILY
Qty: 90 CAPSULE | Refills: 1 | OUTPATIENT
Start: 2021-09-21

## 2021-09-21 RX ORDER — TRAMADOL HYDROCHLORIDE 50 MG/1
50 TABLET ORAL EVERY 8 HOURS PRN
Qty: 90 TABLET | Refills: 0 | OUTPATIENT
Start: 2021-09-21

## 2021-10-05 ENCOUNTER — TELEMEDICINE (OUTPATIENT)
Dept: FAMILY MEDICINE CLINIC | Facility: CLINIC | Age: 31
End: 2021-10-05

## 2021-10-05 DIAGNOSIS — J01.10 ACUTE NON-RECURRENT FRONTAL SINUSITIS: Primary | ICD-10-CM

## 2021-10-05 DIAGNOSIS — F41.9 ANXIETY: ICD-10-CM

## 2021-10-05 DIAGNOSIS — M94.0 COSTOCHONDRITIS: ICD-10-CM

## 2021-10-05 PROCEDURE — 99213 OFFICE O/P EST LOW 20 MIN: CPT | Performed by: FAMILY MEDICINE

## 2021-10-05 RX ORDER — VENLAFAXINE HYDROCHLORIDE 75 MG/1
75 CAPSULE, EXTENDED RELEASE ORAL DAILY
Qty: 90 CAPSULE | Refills: 1 | Status: SHIPPED | OUTPATIENT
Start: 2021-10-05 | End: 2022-01-12 | Stop reason: SDUPTHER

## 2021-10-05 RX ORDER — AMOXICILLIN AND CLAVULANATE POTASSIUM 875; 125 MG/1; MG/1
1 TABLET, FILM COATED ORAL 2 TIMES DAILY
Qty: 20 TABLET | Refills: 0 | Status: SHIPPED | OUTPATIENT
Start: 2021-10-05 | End: 2022-01-12

## 2021-10-05 RX ORDER — PREDNISONE 20 MG/1
60 TABLET ORAL DAILY
Qty: 15 TABLET | Refills: 0 | Status: SHIPPED | OUTPATIENT
Start: 2021-10-05 | End: 2021-10-21 | Stop reason: SDUPTHER

## 2021-10-05 RX ORDER — TRAMADOL HYDROCHLORIDE 50 MG/1
50 TABLET ORAL EVERY 8 HOURS PRN
Qty: 90 TABLET | Refills: 0 | Status: SHIPPED | OUTPATIENT
Start: 2021-10-05 | End: 2022-01-12 | Stop reason: SDUPTHER

## 2021-10-05 NOTE — PROGRESS NOTES
Subjective   Shira Robbins is a 31 y.o. female.     Chief Complaint   Patient presents with   • Cough       History of Present Illness   Patient is worried for sinus infection. Has had her covid shots. For over 10 days. Has congestion and sinus pressure. She is having a hard time using her cpap 2/2 this.      The following portions of the patient's history were reviewed and updated as appropriate: allergies, current medications, past family history, past medical history, past social history, past surgical history and problem list.    Past Medical History:   Diagnosis Date   • Allergic    • Anxiety    • Cancer (HCC)    • Cervical cancer (HCC)     in remission        Past Surgical History:   Procedure Laterality Date   • DILATATION AND CURETTAGE     • EAR RECONSTRUCTION     • TONSILLECTOMY     • WISDOM TOOTH EXTRACTION         Family History   Problem Relation Age of Onset   • Heart disease Father    • Hyperlipidemia Father    • Breast cancer Maternal Grandmother        Social History     Socioeconomic History   • Marital status: Single     Spouse name: Not on file   • Number of children: Not on file   • Years of education: Not on file   • Highest education level: Not on file   Tobacco Use   • Smoking status: Never Smoker   • Smokeless tobacco: Never Used   Substance and Sexual Activity   • Alcohol use: Yes     Comment: occ   • Drug use: No   • Sexual activity: Defer       Review of Systems   Constitutional: Negative for fever.   Respiratory: Negative for shortness of breath.        Objective   There were no vitals taken for this visit.  There is no height or weight on file to calculate BMI.  Physical Exam  Constitutional:       General: She is not in acute distress.     Appearance: Normal appearance.   Neurological:      General: No focal deficit present.      Mental Status: She is alert and oriented to person, place, and time.   Psychiatric:         Mood and Affect: Mood normal.         Behavior: Behavior  normal.           Assessment/Plan   Diagnoses and all orders for this visit:    1. Acute non-recurrent frontal sinusitis (Primary)  -     amoxicillin-clavulanate (Augmentin) 875-125 MG per tablet; Take 1 tablet by mouth 2 (Two) Times a Day.  Dispense: 20 tablet; Refill: 0  -     predniSONE (DELTASONE) 20 MG tablet; Take 3 tablets by mouth Daily.  Dispense: 15 tablet; Refill: 0    2. Costochondritis  -     traMADol (ULTRAM) 50 MG tablet; Take 1 tablet by mouth Every 8 (Eight) Hours As Needed for Moderate Pain .  Dispense: 90 tablet; Refill: 0    3. Anxiety  -     venlafaxine XR (Effexor XR) 75 MG 24 hr capsule; Take 1 capsule by mouth Daily.  Dispense: 90 capsule; Refill: 1        Rest, fluids and follow up if worse or no better.   Consent to video visit secondary to the pandemic and spent 9 minutes.

## 2021-11-19 ENCOUNTER — OFFICE VISIT (OUTPATIENT)
Dept: SLEEP MEDICINE | Facility: HOSPITAL | Age: 31
End: 2021-11-19

## 2021-11-19 VITALS
BODY MASS INDEX: 29.37 KG/M2 | HEIGHT: 64 IN | DIASTOLIC BLOOD PRESSURE: 80 MMHG | OXYGEN SATURATION: 99 % | WEIGHT: 172 LBS | SYSTOLIC BLOOD PRESSURE: 151 MMHG | HEART RATE: 81 BPM

## 2021-11-19 DIAGNOSIS — G47.33 OSA (OBSTRUCTIVE SLEEP APNEA): Primary | ICD-10-CM

## 2021-11-19 DIAGNOSIS — Z78.9 INTOLERANCE OF CONTINUOUS POSITIVE AIRWAY PRESSURE (CPAP) VENTILATION: ICD-10-CM

## 2021-11-19 PROCEDURE — G0463 HOSPITAL OUTPT CLINIC VISIT: HCPCS

## 2021-11-19 RX ORDER — ZOLPIDEM TARTRATE 5 MG/1
5 TABLET ORAL NIGHTLY PRN
Qty: 1 TABLET | Refills: 0 | Status: SHIPPED | OUTPATIENT
Start: 2021-11-19 | End: 2022-01-12

## 2021-11-19 NOTE — PROGRESS NOTES
[facility]  Telephone: [phone] / Fax: [phone] [place]  Telephone: [phone] / Fax: [phone] Leyda Looney    PCP: Marianela Morin MD    Reason for visit: YASMINE f/u    Shira Robbins is a 31 y.o.female  was last seen at [facility] sleep lab in August and was started on auto CPAP 5-20cm H2O. She has been struggling with mask/pressures. It is hard for her to maneuver with the mask. She previously tried nasal mask, but due to facial scarring changed to nose and mouth mask. There is no complaint of aerophagia, excessive dry mouth. Her download shows large leak. She gets about 9 hours of sleep.    SH- no tobacco, 4 alc per week, 1 caffeine per day, no energy drinks.    ROS- negative.    DME [employer]    Current Medications:    Current Outpatient Medications:   •  amoxicillin-clavulanate (Augmentin) 875-125 MG per tablet, Take 1 tablet by mouth 2 (Two) Times a Day., Disp: 20 tablet, Rfl: 0  •  azithromycin (ZITHROMAX) 250 MG tablet, Take 2 tablets the first day, then 1 tablet daily for 4 days., Disp: 6 tablet, Rfl: 0  •  cetirizine (zyrTEC) 10 MG tablet, Take 10 mg by mouth Daily., Disp: , Rfl:   •  dicyclomine (Bentyl) 10 MG capsule, Take 1 capsule by mouth 4 (Four) Times a Day Before Meals & at Bedtime., Disp: 450 capsule, Rfl: 1  •  erythromycin (ROMYCIN) 5 MG/GM ophthalmic ointment, Apply a 1/2 inch ribbon to left eye 4 times a day for 7 days, Disp: 3.5 g, Rfl: 0  •  meloxicam (Mobic) 15 MG tablet, Take 1 tablet by mouth Daily., Disp: 90 tablet, Rfl: 1  •  tobramycin (Tobrex) 0.3 % solution ophthalmic solution, Administer 2 drops into the left eye Every 4 (Four) Hours., Disp: 5 mL, Rfl: 0  •  tobramycin 0.3 % solution ophthalmic solution, Administer 2 drops into the left eye Every 4 (Four) Hours., Disp: 5 mL, Rfl: 1  •  traMADol (ULTRAM) 50 MG tablet, Take 1 tablet by mouth Every 8 (Eight) Hours As Needed for Moderate Pain ., Disp: 90 tablet, Rfl: 0  •  venlafaxine XR (Effexor  "XR) 75 MG 24 hr capsule, Take 1 capsule by mouth Daily., Disp: 90 capsule, Rfl: 1   also entered in Sleep Questionnaire    Patient  has a past medical history of Allergic, Anxiety, Cancer (HCC), and Cervical cancer (HCC).    I have reviewed the Past Medical History, Past Surgical History, Social History and Family History.    Vital Signs /80   Pulse 81   Ht 162.6 cm (64\")   Wt 78 kg (172 lb)   SpO2 99%   BMI 29.52 kg/m²  Body mass index is 29.52 kg/m².    General Alert and oriented. No acute distress noted   Pharynx/Throat Class IV   Mallampati airway, large tongue, no evidence of redundant lateral pharyngeal tissue. No oral lesions. No thrush. Moist mucous membranes.   Head Normocephalic. Symmetrical. Atraumatic.    Nose No septal deviation. No drainage   Chest Wall Normal shape. Symmetric expansion with respiration. No tenderness.   Neck Trachea midline, no thyromegaly or adenopathy    Lungs Clear to auscultation bilaterally. No wheezes. No rhonchi. No rales. Respirations regular, even and unlabored.   Heart Regular rhythm and normal rate. Normal S1 and S2. No murmur   Abdomen Soft, non-tender and non-distended. Normal bowel sounds. No masses.   Extremities Moves all extremities well. No edema   Psychiatric Normal mood and affect.     Testing:  · Download 8/19/21-11/16/21 40% use with average nightly use of 1 hour and 28 minutes on auto CPAP 5-20cm with avg pressure of 6.9cm, AHI 31.2, leak of 54 L.min.    Study-    Impression:  1. YASMINE (obstructive sleep apnea)    2. Intolerance of continuous positive airway pressure (CPAP) ventilation          Plan:  CPAP intolerant despite trying several masks. Currently on FFM.  Pressures are 5-20cm H2O, with avg or 6.9cm H2O, but high leak of 54 L.min and intolerance resulting in removal of the mask. Subsequently more tired/sleepy during the day. Avg use on CPAP is only 1 hour and 28 min since August. I discussed doing in lab CPAP titration with patient. She agrees. " We will proceed. Do study with 5mg of Ambien.    Follow up after study.    Thank you for allowing me to participate in your patient's care.      FLACO Kline  [facility]  Phone: [phone]      Part of this note may be an electronic transcription/translation of spoken language to printed text using the Dragon Dictation System.

## 2021-12-30 ENCOUNTER — TRANSCRIBE ORDERS (OUTPATIENT)
Dept: ADMINISTRATIVE | Facility: HOSPITAL | Age: 31
End: 2021-12-30

## 2021-12-30 DIAGNOSIS — Z01.818 OTHER SPECIFIED PRE-OPERATIVE EXAMINATION: Primary | ICD-10-CM

## 2022-01-05 ENCOUNTER — TELEPHONE (OUTPATIENT)
Dept: FAMILY MEDICINE CLINIC | Facility: CLINIC | Age: 32
End: 2022-01-05

## 2022-01-05 NOTE — TELEPHONE ENCOUNTER
PATIENT CALLING IN TO REQUEST SOME ADDERRALL FOR FOCUS WHEN GOING BACK TO SCHOOL. PATIENT REQUESTING REFERRAL TO ENT. PLEASE ADVISE THANK YOU!

## 2022-01-07 ENCOUNTER — LAB (OUTPATIENT)
Dept: LAB | Facility: HOSPITAL | Age: 32
End: 2022-01-07

## 2022-01-07 DIAGNOSIS — Z01.818 OTHER SPECIFIED PRE-OPERATIVE EXAMINATION: ICD-10-CM

## 2022-01-07 LAB — SARS-COV-2 ORF1AB RESP QL NAA+PROBE: DETECTED

## 2022-01-07 PROCEDURE — U0004 COV-19 TEST NON-CDC HGH THRU: HCPCS

## 2022-01-07 PROCEDURE — C9803 HOPD COVID-19 SPEC COLLECT: HCPCS

## 2022-01-08 ENCOUNTER — DOCUMENTATION (OUTPATIENT)
Dept: PREOP | Facility: HOSPITAL | Age: 32
End: 2022-01-08

## 2022-01-12 ENCOUNTER — TELEMEDICINE (OUTPATIENT)
Dept: FAMILY MEDICINE CLINIC | Facility: CLINIC | Age: 32
End: 2022-01-12

## 2022-01-12 DIAGNOSIS — M94.0 COSTOCHONDRITIS: ICD-10-CM

## 2022-01-12 DIAGNOSIS — J34.2 DEVIATED SEPTUM: ICD-10-CM

## 2022-01-12 DIAGNOSIS — K90.0 CELIAC DISEASE: ICD-10-CM

## 2022-01-12 DIAGNOSIS — F98.8 ATTENTION DEFICIT DISORDER (ADD) WITHOUT HYPERACTIVITY: ICD-10-CM

## 2022-01-12 DIAGNOSIS — F41.9 ANXIETY: Primary | ICD-10-CM

## 2022-01-12 PROBLEM — H10.32 ACUTE BACTERIAL CONJUNCTIVITIS OF LEFT EYE: Status: RESOLVED | Noted: 2020-10-29 | Resolved: 2022-01-12

## 2022-01-12 PROBLEM — J01.10 ACUTE NON-RECURRENT FRONTAL SINUSITIS: Status: RESOLVED | Noted: 2021-10-05 | Resolved: 2022-01-12

## 2022-01-12 PROCEDURE — 99214 OFFICE O/P EST MOD 30 MIN: CPT | Performed by: FAMILY MEDICINE

## 2022-01-12 RX ORDER — DEXTROAMPHETAMINE SACCHARATE, AMPHETAMINE ASPARTATE MONOHYDRATE, DEXTROAMPHETAMINE SULFATE AND AMPHETAMINE SULFATE 2.5; 2.5; 2.5; 2.5 MG/1; MG/1; MG/1; MG/1
10 CAPSULE, EXTENDED RELEASE ORAL EVERY MORNING
Qty: 90 CAPSULE | Refills: 0 | Status: SHIPPED | OUTPATIENT
Start: 2022-01-12 | End: 2022-05-01 | Stop reason: SDUPTHER

## 2022-01-12 RX ORDER — CETIRIZINE HYDROCHLORIDE 10 MG/1
10 TABLET ORAL DAILY
Qty: 90 TABLET | Refills: 3 | Status: SHIPPED | OUTPATIENT
Start: 2022-01-12 | End: 2022-11-02 | Stop reason: SDUPTHER

## 2022-01-12 RX ORDER — TRAMADOL HYDROCHLORIDE 50 MG/1
50 TABLET ORAL EVERY 8 HOURS PRN
Qty: 90 TABLET | Refills: 0 | Status: SHIPPED | OUTPATIENT
Start: 2022-01-12 | End: 2022-03-01 | Stop reason: HOSPADM

## 2022-01-12 RX ORDER — VENLAFAXINE HYDROCHLORIDE 75 MG/1
75 CAPSULE, EXTENDED RELEASE ORAL DAILY
Qty: 90 CAPSULE | Refills: 3 | Status: SHIPPED | OUTPATIENT
Start: 2022-01-12 | End: 2022-11-02 | Stop reason: SDUPTHER

## 2022-01-12 NOTE — PROGRESS NOTES
Subjective   Shira Robbins is a 31 y.o. female.     Chief Complaint   Patient presents with   • Anxiety       History of Present Illness   Anxiety-stable on medication, no se    Celiac- no issues, controlled with diet.     Patient is asking for an ENT referral. Has sleep apnea and a deviated septum.    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 Adderall worked well in the past.     The following portions of the patient's history were reviewed and updated as appropriate: allergies, current medications, past family history, past medical history, past social history, past surgical history and problem list.    Past Medical History:   Diagnosis Date   • Allergic    • Anxiety    • Cancer (HCC)    • Cervical cancer (HCC)     in remission        Past Surgical History:   Procedure Laterality Date   • DILATATION AND CURETTAGE     • EAR RECONSTRUCTION     • TONSILLECTOMY     • WISDOM TOOTH EXTRACTION         Family History   Problem Relation Age of Onset   • Heart disease Father    • Hyperlipidemia Father    • Breast cancer Maternal Grandmother        Social History     Socioeconomic History   • Marital status: Single   Tobacco Use   • Smoking status: Never Smoker   • Smokeless tobacco: Never Used   Substance and Sexual Activity   • Alcohol use: Yes     Comment: occ   • Drug use: No   • Sexual activity: Defer       Review of Systems   Constitutional: Negative for fever.   Respiratory: Negative for shortness of breath.        Objective   There were no vitals taken for this visit.  There is no height or weight on file to calculate BMI.  Physical Exam  Constitutional:       General: She is not in acute distress.     Appearance: Normal appearance.   Neurological:      General: No focal deficit present.      Mental Status: She is alert and oriented to person, place, and time.   Psychiatric:         Mood and Affect: Mood normal.         Behavior: Behavior normal.            Assessment/Plan   Diagnoses and all orders for this visit:    1. Anxiety (Primary)  -     venlafaxine XR (Effexor XR) 75 MG 24 hr capsule; Take 1 capsule by mouth Daily.  Dispense: 90 capsule; Refill: 3    2. Celiac disease    3. Deviated septum  -     Ambulatory Referral to ENT (Otolaryngology)    4. Costochondritis  -     traMADol (ULTRAM) 50 MG tablet; Take 1 tablet by mouth Every 8 (Eight) Hours As Needed for Moderate Pain .  Dispense: 90 tablet; Refill: 0    5. Attention deficit disorder (ADD) without hyperactivity  -     amphetamine-dextroamphetamine XR (Adderall XR) 10 MG 24 hr capsule; Take 1 capsule by mouth Every Morning  Dispense: 90 capsule; Refill: 0    Other orders  -     cetirizine (zyrTEC) 10 MG tablet; Take 1 tablet by mouth Daily.  Dispense: 90 tablet; Refill: 3        Tommie run and reviewed. Discussed risks including but not limites to fatigue, falls, constipation, somnolence, dependence and addiction. Also discussed alternatives etc. F/U in 3 months.  Consent to video visit secondary to the pandemic and spent 14 minutes.

## 2022-02-01 ENCOUNTER — OFFICE VISIT (OUTPATIENT)
Dept: FAMILY MEDICINE CLINIC | Facility: CLINIC | Age: 32
End: 2022-02-01

## 2022-02-01 VITALS
BODY MASS INDEX: 29.3 KG/M2 | HEART RATE: 101 BPM | OXYGEN SATURATION: 100 % | WEIGHT: 171.6 LBS | SYSTOLIC BLOOD PRESSURE: 132 MMHG | TEMPERATURE: 97.8 F | DIASTOLIC BLOOD PRESSURE: 94 MMHG | HEIGHT: 64 IN

## 2022-02-01 DIAGNOSIS — Z30.011 ENCOUNTER FOR INITIAL PRESCRIPTION OF CONTRACEPTIVE PILLS: Primary | ICD-10-CM

## 2022-02-01 PROCEDURE — 99214 OFFICE O/P EST MOD 30 MIN: CPT | Performed by: FAMILY MEDICINE

## 2022-02-01 RX ORDER — NORGESTIMATE AND ETHINYL ESTRADIOL 0.25-0.035
1 KIT ORAL DAILY
Qty: 90 TABLET | Refills: 3 | Status: SHIPPED | OUTPATIENT
Start: 2022-02-01 | End: 2022-11-02 | Stop reason: ALTCHOICE

## 2022-02-01 NOTE — PROGRESS NOTES
"Subjective   Shira Robbins is a 32 y.o. female.     Chief Complaint   Patient presents with   • Contraception     try the pill       History of Present Illness   Pt is using condoms and is on her period. Has tried implanon and depo and wanting to try ocp. Wanting for pregnancy prevention. Declines leaving pregnancy test.     The following portions of the patient's history were reviewed and updated as appropriate: allergies, current medications, past family history, past medical history, past social history, past surgical history and problem list.    Past Medical History:   Diagnosis Date   • ADHD (attention deficit hyperactivity disorder)    • Allergic    • Anxiety    • Cancer (HCC)    • Cervical cancer (HCC)     in remission        Past Surgical History:   Procedure Laterality Date   • DILATATION AND CURETTAGE     • EAR RECONSTRUCTION     • TONSILLECTOMY     • WISDOM TOOTH EXTRACTION         Family History   Problem Relation Age of Onset   • Heart disease Father    • Hyperlipidemia Father    • Breast cancer Maternal Grandmother        Social History     Socioeconomic History   • Marital status: Single   Tobacco Use   • Smoking status: Never Smoker   • Smokeless tobacco: Never Used   Substance and Sexual Activity   • Alcohol use: Yes     Alcohol/week: 0.0 standard drinks     Comment: occ   • Drug use: No   • Sexual activity: Defer       Review of Systems   Constitutional: Negative for fever.   Respiratory: Negative for shortness of breath.        Objective   Visit Vitals  /94 (BP Location: Left arm, Patient Position: Sitting)   Pulse 101   Temp 97.8 °F (36.6 °C)   Ht 162.6 cm (64.02\")   Wt 77.8 kg (171 lb 9.6 oz)   SpO2 100%   BMI 29.44 kg/m²     Body mass index is 29.44 kg/m².  Physical Exam  Constitutional:       General: She is not in acute distress.     Appearance: Normal appearance.   Neurological:      General: No focal deficit present.      Mental Status: She is alert and oriented to person, place, " and time.   Psychiatric:         Mood and Affect: Mood normal.         Behavior: Behavior normal.           Assessment/Plan   Diagnoses and all orders for this visit:    1. Encounter for initial prescription of contraceptive pills (Primary)  -     norgestimate-ethinyl estradiol (Sprintec 28) 0.25-35 MG-MCG per tablet; Take 1 tablet by mouth Daily.  Dispense: 90 tablet; Refill: 3      Discussed risk,benefits and use of medication.

## 2022-02-02 ENCOUNTER — TELEPHONE (OUTPATIENT)
Dept: FAMILY MEDICINE CLINIC | Facility: CLINIC | Age: 32
End: 2022-02-02

## 2022-02-02 NOTE — TELEPHONE ENCOUNTER
Caller: Shira Robbins    Relationship: Self    Best call back number: [phone]    What form or medical record are you requesting: DOCTORS NOTE/EXCUSE     Who is requesting this form or medical record from you: SCHOOL    How would you like to receive the form or medical records (pick-up, mail, fax):  PATIENT WOULD LIKE IT TO BE PUT IN HER MYCHART    Timeframe paperwork needed: ASAP    Additional notes: FOR VISIT ON 02/01/22

## 2022-02-09 ENCOUNTER — TELEPHONE (OUTPATIENT)
Dept: FAMILY MEDICINE CLINIC | Facility: CLINIC | Age: 32
End: 2022-02-09

## 2022-02-09 NOTE — TELEPHONE ENCOUNTER
Caller: Shira Robbins     Relationship to Patient: SELF    Phone Number: [phone]    Reason for Call: PATIENT CALLED SAYING SHE WENT TO AN ENT AND THEY SAID SHE NEEDS TO GO IN FOR EMERGENCY SURGERY FOR HER DEVIATED SEPTUM. THEY SAID TO SPEAK WITH HER DOCTOR TO GET AN ORDER SENT IN TO [facility] TO GET THE PROCESS GOING ON THIS. SHE NEEDS THIS AS SOON AS POSSIBLE.   SHE HAS ALREADY TAKEN OFF WORK FOR February 28TH- MARCH 3RD. SHE NEEDS THIS PROCEDURE DONE DURING THAT TIMEFRAME.

## 2022-02-11 NOTE — TELEPHONE ENCOUNTER
I think she is confused. Primary care doctors don't send in orders for surgery. Does she mean she needs an appointment for surgery clearance?

## 2022-02-25 ENCOUNTER — PRE-ADMISSION TESTING (OUTPATIENT)
Dept: PREADMISSION TESTING | Facility: HOSPITAL | Age: 32
End: 2022-02-25

## 2022-02-25 VITALS
TEMPERATURE: 98.6 F | HEART RATE: 81 BPM | OXYGEN SATURATION: 98 % | BODY MASS INDEX: 29.53 KG/M2 | DIASTOLIC BLOOD PRESSURE: 94 MMHG | RESPIRATION RATE: 16 BRPM | HEIGHT: 64 IN | SYSTOLIC BLOOD PRESSURE: 144 MMHG | WEIGHT: 173 LBS

## 2022-02-25 LAB
ANION GAP SERPL CALCULATED.3IONS-SCNC: 10.4 MMOL/L (ref 5–15)
BUN SERPL-MCNC: 13 MG/DL (ref 6–20)
BUN/CREAT SERPL: 9.8 (ref 7–25)
CALCIUM SPEC-SCNC: 9.1 MG/DL (ref 8.6–10.5)
CHLORIDE SERPL-SCNC: 107 MMOL/L (ref 98–107)
CO2 SERPL-SCNC: 20.6 MMOL/L (ref 22–29)
CREAT SERPL-MCNC: 1.33 MG/DL (ref 0.57–1)
DEPRECATED RDW RBC AUTO: 41.1 FL (ref 37–54)
ERYTHROCYTE [DISTWIDTH] IN BLOOD BY AUTOMATED COUNT: 12.8 % (ref 12.3–15.4)
GFR SERPL CREATININE-BSD FRML MDRD: 46 ML/MIN/1.73
GFR SERPL CREATININE-BSD FRML MDRD: 56 ML/MIN/1.73
GLUCOSE SERPL-MCNC: 126 MG/DL (ref 65–99)
HCG SERPL QL: NEGATIVE
HCT VFR BLD AUTO: 38.4 % (ref 34–46.6)
HGB BLD-MCNC: 12.6 G/DL (ref 12–15.9)
MCH RBC QN AUTO: 29 PG (ref 26.6–33)
MCHC RBC AUTO-ENTMCNC: 32.8 G/DL (ref 31.5–35.7)
MCV RBC AUTO: 88.5 FL (ref 79–97)
PLATELET # BLD AUTO: 381 10*3/MM3 (ref 140–450)
PMV BLD AUTO: 9.5 FL (ref 6–12)
POTASSIUM SERPL-SCNC: 4.4 MMOL/L (ref 3.5–5.2)
RBC # BLD AUTO: 4.34 10*6/MM3 (ref 3.77–5.28)
SODIUM SERPL-SCNC: 138 MMOL/L (ref 136–145)
WBC NRBC COR # BLD: 8.2 10*3/MM3 (ref 3.4–10.8)

## 2022-02-25 PROCEDURE — 93005 ELECTROCARDIOGRAM TRACING: CPT

## 2022-02-25 PROCEDURE — 93010 ELECTROCARDIOGRAM REPORT: CPT | Performed by: INTERNAL MEDICINE

## 2022-02-25 PROCEDURE — 85027 COMPLETE CBC AUTOMATED: CPT

## 2022-02-25 PROCEDURE — 36415 COLL VENOUS BLD VENIPUNCTURE: CPT

## 2022-02-25 PROCEDURE — 80048 BASIC METABOLIC PNL TOTAL CA: CPT

## 2022-02-25 PROCEDURE — 84703 CHORIONIC GONADOTROPIN ASSAY: CPT

## 2022-02-26 ENCOUNTER — LAB (OUTPATIENT)
Dept: LAB | Facility: HOSPITAL | Age: 32
End: 2022-02-26

## 2022-02-26 LAB — SARS-COV-2 ORF1AB RESP QL NAA+PROBE: NOT DETECTED

## 2022-02-26 PROCEDURE — C9803 HOPD COVID-19 SPEC COLLECT: HCPCS

## 2022-02-26 PROCEDURE — U0004 COV-19 TEST NON-CDC HGH THRU: HCPCS

## 2022-02-28 LAB — QT INTERVAL: 398 MS

## 2022-03-01 ENCOUNTER — HOSPITAL ENCOUNTER (OUTPATIENT)
Facility: HOSPITAL | Age: 32
Setting detail: HOSPITAL OUTPATIENT SURGERY
Discharge: HOME | End: 2022-03-01
Attending: OTOLARYNGOLOGY | Admitting: OTOLARYNGOLOGY

## 2022-03-01 ENCOUNTER — ANESTHESIA (OUTPATIENT)
Dept: PERIOP | Facility: HOSPITAL | Age: 32
End: 2022-03-01

## 2022-03-01 ENCOUNTER — ANESTHESIA EVENT (OUTPATIENT)
Dept: PERIOP | Facility: HOSPITAL | Age: 32
End: 2022-03-01

## 2022-03-01 VITALS
HEART RATE: 80 BPM | SYSTOLIC BLOOD PRESSURE: 153 MMHG | TEMPERATURE: 97.9 F | DIASTOLIC BLOOD PRESSURE: 100 MMHG | OXYGEN SATURATION: 99 % | RESPIRATION RATE: 18 BRPM

## 2022-03-01 DIAGNOSIS — J34.2 NASAL SEPTAL DEVIATION: Primary | ICD-10-CM

## 2022-03-01 PROCEDURE — 25010000002 ONDANSETRON PER 1 MG: Performed by: NURSE ANESTHETIST, CERTIFIED REGISTERED

## 2022-03-01 PROCEDURE — 25010000002 PROPOFOL 10 MG/ML EMULSION: Performed by: NURSE ANESTHETIST, CERTIFIED REGISTERED

## 2022-03-01 PROCEDURE — 25010000002 FENTANYL CITRATE (PF) 50 MCG/ML SOLUTION: Performed by: NURSE ANESTHETIST, CERTIFIED REGISTERED

## 2022-03-01 PROCEDURE — 25010000002 DEXAMETHASONE PER 1 MG: Performed by: NURSE ANESTHETIST, CERTIFIED REGISTERED

## 2022-03-01 PROCEDURE — 25010000002 MIDAZOLAM PER 1 MG: Performed by: ANESTHESIOLOGY

## 2022-03-01 PROCEDURE — 0 EPINEPHRINE 1 MG/ML SOLUTION: Performed by: OTOLARYNGOLOGY

## 2022-03-01 PROCEDURE — 0 CEFAZOLIN IN DEXTROSE 2-4 GM/100ML-% SOLUTION: Performed by: OTOLARYNGOLOGY

## 2022-03-01 PROCEDURE — 25010000002 HYDRALAZINE PER 20 MG: Performed by: NURSE ANESTHETIST, CERTIFIED REGISTERED

## 2022-03-01 PROCEDURE — 25010000002 HYDROMORPHONE PER 4 MG: Performed by: NURSE ANESTHETIST, CERTIFIED REGISTERED

## 2022-03-01 PROCEDURE — 25010000002 NEOSTIGMINE 5 MG/10ML SOLUTION: Performed by: NURSE ANESTHETIST, CERTIFIED REGISTERED

## 2022-03-01 RX ORDER — ONDANSETRON 2 MG/ML
INJECTION INTRAMUSCULAR; INTRAVENOUS AS NEEDED
Status: DISCONTINUED | OUTPATIENT
Start: 2022-03-01 | End: 2022-03-01 | Stop reason: SURG

## 2022-03-01 RX ORDER — ONDANSETRON 2 MG/ML
4 INJECTION INTRAMUSCULAR; INTRAVENOUS ONCE AS NEEDED
Status: COMPLETED | OUTPATIENT
Start: 2022-03-01 | End: 2022-03-01

## 2022-03-01 RX ORDER — OXYCODONE AND ACETAMINOPHEN 7.5; 325 MG/1; MG/1
1 TABLET ORAL EVERY 4 HOURS PRN
Status: DISCONTINUED | OUTPATIENT
Start: 2022-03-01 | End: 2022-03-01 | Stop reason: HOSPADM

## 2022-03-01 RX ORDER — FAMOTIDINE 10 MG/ML
20 INJECTION, SOLUTION INTRAVENOUS ONCE
Status: COMPLETED | OUTPATIENT
Start: 2022-03-01 | End: 2022-03-01

## 2022-03-01 RX ORDER — CEFAZOLIN SODIUM 2 G/100ML
2 INJECTION, SOLUTION INTRAVENOUS ONCE
Status: COMPLETED | OUTPATIENT
Start: 2022-03-01 | End: 2022-03-01

## 2022-03-01 RX ORDER — SODIUM CHLORIDE 0.9 % (FLUSH) 0.9 %
3 SYRINGE (ML) INJECTION EVERY 12 HOURS SCHEDULED
Status: DISCONTINUED | OUTPATIENT
Start: 2022-03-01 | End: 2022-03-01 | Stop reason: HOSPADM

## 2022-03-01 RX ORDER — FENTANYL CITRATE 50 UG/ML
50 INJECTION, SOLUTION INTRAMUSCULAR; INTRAVENOUS
Status: DISCONTINUED | OUTPATIENT
Start: 2022-03-01 | End: 2022-03-01 | Stop reason: HOSPADM

## 2022-03-01 RX ORDER — HYDROMORPHONE HYDROCHLORIDE 1 MG/ML
0.5 INJECTION, SOLUTION INTRAMUSCULAR; INTRAVENOUS; SUBCUTANEOUS
Status: DISCONTINUED | OUTPATIENT
Start: 2022-03-01 | End: 2022-03-01 | Stop reason: HOSPADM

## 2022-03-01 RX ORDER — GLYCOPYRROLATE 0.2 MG/ML
INJECTION INTRAMUSCULAR; INTRAVENOUS AS NEEDED
Status: DISCONTINUED | OUTPATIENT
Start: 2022-03-01 | End: 2022-03-01 | Stop reason: SURG

## 2022-03-01 RX ORDER — PROPOFOL 10 MG/ML
VIAL (ML) INTRAVENOUS AS NEEDED
Status: DISCONTINUED | OUTPATIENT
Start: 2022-03-01 | End: 2022-03-01 | Stop reason: SURG

## 2022-03-01 RX ORDER — MIDAZOLAM HYDROCHLORIDE 1 MG/ML
1 INJECTION INTRAMUSCULAR; INTRAVENOUS
Status: COMPLETED | OUTPATIENT
Start: 2022-03-01 | End: 2022-03-01

## 2022-03-01 RX ORDER — CEPHALEXIN 250 MG/1
250 CAPSULE ORAL 4 TIMES DAILY
Qty: 20 CAPSULE | Refills: 0 | Status: SHIPPED | OUTPATIENT
Start: 2022-03-01 | End: 2022-05-23

## 2022-03-01 RX ORDER — ROCURONIUM BROMIDE 10 MG/ML
INJECTION, SOLUTION INTRAVENOUS AS NEEDED
Status: DISCONTINUED | OUTPATIENT
Start: 2022-03-01 | End: 2022-03-01 | Stop reason: SURG

## 2022-03-01 RX ORDER — PROMETHAZINE HYDROCHLORIDE 25 MG/1
25 TABLET ORAL ONCE AS NEEDED
Status: DISCONTINUED | OUTPATIENT
Start: 2022-03-01 | End: 2022-03-01 | Stop reason: HOSPADM

## 2022-03-01 RX ORDER — HYDROCODONE BITARTRATE AND ACETAMINOPHEN 7.5; 325 MG/1; MG/1
1 TABLET ORAL ONCE AS NEEDED
Status: COMPLETED | OUTPATIENT
Start: 2022-03-01 | End: 2022-03-01

## 2022-03-01 RX ORDER — FENTANYL CITRATE 50 UG/ML
INJECTION, SOLUTION INTRAMUSCULAR; INTRAVENOUS AS NEEDED
Status: DISCONTINUED | OUTPATIENT
Start: 2022-03-01 | End: 2022-03-01 | Stop reason: SURG

## 2022-03-01 RX ORDER — FLUMAZENIL 0.1 MG/ML
0.2 INJECTION INTRAVENOUS AS NEEDED
Status: DISCONTINUED | OUTPATIENT
Start: 2022-03-01 | End: 2022-03-01 | Stop reason: HOSPADM

## 2022-03-01 RX ORDER — HYDRALAZINE HYDROCHLORIDE 20 MG/ML
5 INJECTION INTRAMUSCULAR; INTRAVENOUS
Status: DISCONTINUED | OUTPATIENT
Start: 2022-03-01 | End: 2022-03-01 | Stop reason: HOSPADM

## 2022-03-01 RX ORDER — NALOXONE HCL 0.4 MG/ML
0.2 VIAL (ML) INJECTION AS NEEDED
Status: DISCONTINUED | OUTPATIENT
Start: 2022-03-01 | End: 2022-03-01 | Stop reason: HOSPADM

## 2022-03-01 RX ORDER — HYDROCODONE BITARTRATE AND ACETAMINOPHEN 5; 325 MG/1; MG/1
1 TABLET ORAL EVERY 4 HOURS PRN
Qty: 15 TABLET | Refills: 0 | Status: SHIPPED | OUTPATIENT
Start: 2022-03-01

## 2022-03-01 RX ORDER — EPHEDRINE SULFATE 50 MG/ML
5 INJECTION, SOLUTION INTRAVENOUS ONCE AS NEEDED
Status: DISCONTINUED | OUTPATIENT
Start: 2022-03-01 | End: 2022-03-01 | Stop reason: HOSPADM

## 2022-03-01 RX ORDER — MAGNESIUM HYDROXIDE 1200 MG/15ML
LIQUID ORAL AS NEEDED
Status: DISCONTINUED | OUTPATIENT
Start: 2022-03-01 | End: 2022-03-01 | Stop reason: HOSPADM

## 2022-03-01 RX ORDER — ONDANSETRON 8 MG/1
8 TABLET, ORALLY DISINTEGRATING ORAL EVERY 8 HOURS PRN
Qty: 8 TABLET | Refills: 0 | Status: SHIPPED | OUTPATIENT
Start: 2022-03-01 | End: 2022-03-31

## 2022-03-01 RX ORDER — LIDOCAINE HYDROCHLORIDE 10 MG/ML
0.5 INJECTION, SOLUTION EPIDURAL; INFILTRATION; INTRACAUDAL; PERINEURAL ONCE AS NEEDED
Status: COMPLETED | OUTPATIENT
Start: 2022-03-01 | End: 2022-03-01

## 2022-03-01 RX ORDER — PROMETHAZINE HYDROCHLORIDE 25 MG/1
25 SUPPOSITORY RECTAL ONCE AS NEEDED
Status: DISCONTINUED | OUTPATIENT
Start: 2022-03-01 | End: 2022-03-01 | Stop reason: HOSPADM

## 2022-03-01 RX ORDER — SODIUM CHLORIDE 0.9 % (FLUSH) 0.9 %
3-10 SYRINGE (ML) INJECTION AS NEEDED
Status: DISCONTINUED | OUTPATIENT
Start: 2022-03-01 | End: 2022-03-01 | Stop reason: HOSPADM

## 2022-03-01 RX ORDER — DIPHENHYDRAMINE HYDROCHLORIDE 50 MG/ML
12.5 INJECTION INTRAMUSCULAR; INTRAVENOUS
Status: DISCONTINUED | OUTPATIENT
Start: 2022-03-01 | End: 2022-03-01 | Stop reason: HOSPADM

## 2022-03-01 RX ORDER — DIPHENHYDRAMINE HCL 25 MG
25 CAPSULE ORAL
Status: DISCONTINUED | OUTPATIENT
Start: 2022-03-01 | End: 2022-03-01 | Stop reason: HOSPADM

## 2022-03-01 RX ORDER — LIDOCAINE HYDROCHLORIDE AND EPINEPHRINE 10; 10 MG/ML; UG/ML
INJECTION, SOLUTION INFILTRATION; PERINEURAL AS NEEDED
Status: DISCONTINUED | OUTPATIENT
Start: 2022-03-01 | End: 2022-03-01 | Stop reason: HOSPADM

## 2022-03-01 RX ORDER — IBUPROFEN 600 MG/1
600 TABLET ORAL ONCE AS NEEDED
Status: DISCONTINUED | OUTPATIENT
Start: 2022-03-01 | End: 2022-03-01 | Stop reason: HOSPADM

## 2022-03-01 RX ORDER — DEXAMETHASONE SODIUM PHOSPHATE 10 MG/ML
INJECTION INTRAMUSCULAR; INTRAVENOUS AS NEEDED
Status: DISCONTINUED | OUTPATIENT
Start: 2022-03-01 | End: 2022-03-01 | Stop reason: SURG

## 2022-03-01 RX ORDER — LABETALOL HYDROCHLORIDE 5 MG/ML
5 INJECTION, SOLUTION INTRAVENOUS
Status: DISCONTINUED | OUTPATIENT
Start: 2022-03-01 | End: 2022-03-01 | Stop reason: HOSPADM

## 2022-03-01 RX ORDER — LIDOCAINE HYDROCHLORIDE 20 MG/ML
INJECTION, SOLUTION INFILTRATION; PERINEURAL AS NEEDED
Status: DISCONTINUED | OUTPATIENT
Start: 2022-03-01 | End: 2022-03-01 | Stop reason: SURG

## 2022-03-01 RX ORDER — SODIUM CHLORIDE 9 MG/ML
INJECTION, SOLUTION INTRAVENOUS AS NEEDED
Status: DISCONTINUED | OUTPATIENT
Start: 2022-03-01 | End: 2022-03-01 | Stop reason: HOSPADM

## 2022-03-01 RX ORDER — SODIUM CHLORIDE, SODIUM LACTATE, POTASSIUM CHLORIDE, CALCIUM CHLORIDE 600; 310; 30; 20 MG/100ML; MG/100ML; MG/100ML; MG/100ML
9 INJECTION, SOLUTION INTRAVENOUS CONTINUOUS
Status: DISCONTINUED | OUTPATIENT
Start: 2022-03-01 | End: 2022-03-01 | Stop reason: HOSPADM

## 2022-03-01 RX ORDER — NEOSTIGMINE METHYLSULFATE 0.5 MG/ML
INJECTION, SOLUTION INTRAVENOUS AS NEEDED
Status: DISCONTINUED | OUTPATIENT
Start: 2022-03-01 | End: 2022-03-01 | Stop reason: SURG

## 2022-03-01 RX ADMIN — SODIUM CHLORIDE, POTASSIUM CHLORIDE, SODIUM LACTATE AND CALCIUM CHLORIDE 9 ML/HR: 600; 310; 30; 20 INJECTION, SOLUTION INTRAVENOUS at 06:17

## 2022-03-01 RX ADMIN — LABETALOL HYDROCHLORIDE 10 MG: 5 INJECTION, SOLUTION INTRAVENOUS at 09:16

## 2022-03-01 RX ADMIN — FAMOTIDINE 20 MG: 10 INJECTION, SOLUTION INTRAVENOUS at 06:24

## 2022-03-01 RX ADMIN — LIDOCAINE HYDROCHLORIDE 0.5 ML: 10 INJECTION, SOLUTION EPIDURAL; INFILTRATION; INTRACAUDAL; PERINEURAL at 06:15

## 2022-03-01 RX ADMIN — FENTANYL CITRATE 50 MCG: 50 INJECTION INTRAMUSCULAR; INTRAVENOUS at 09:53

## 2022-03-01 RX ADMIN — CEFAZOLIN SODIUM 2 G: 2 INJECTION, SOLUTION INTRAVENOUS at 07:21

## 2022-03-01 RX ADMIN — MIDAZOLAM 1 MG: 1 INJECTION INTRAMUSCULAR; INTRAVENOUS at 06:24

## 2022-03-01 RX ADMIN — MIDAZOLAM 1 MG: 1 INJECTION INTRAMUSCULAR; INTRAVENOUS at 06:32

## 2022-03-01 RX ADMIN — FENTANYL CITRATE 50 MCG: 50 INJECTION INTRAMUSCULAR; INTRAVENOUS at 09:42

## 2022-03-01 RX ADMIN — LIDOCAINE HYDROCHLORIDE 100 MG: 20 INJECTION, SOLUTION INFILTRATION; PERINEURAL at 07:33

## 2022-03-01 RX ADMIN — ONDANSETRON 4 MG: 2 INJECTION INTRAMUSCULAR; INTRAVENOUS at 09:52

## 2022-03-01 RX ADMIN — DEXAMETHASONE SODIUM PHOSPHATE 12 MG: 10 INJECTION INTRAMUSCULAR; INTRAVENOUS at 07:50

## 2022-03-01 RX ADMIN — FENTANYL CITRATE 100 MCG: 50 INJECTION INTRAMUSCULAR; INTRAVENOUS at 07:33

## 2022-03-01 RX ADMIN — HYDROCODONE BITARTRATE AND ACETAMINOPHEN 1 TABLET: 7.5; 325 TABLET ORAL at 09:55

## 2022-03-01 RX ADMIN — NEOSTIGMINE METHYLSULFATE 3 MG: 0.5 INJECTION INTRAVENOUS at 09:00

## 2022-03-01 RX ADMIN — GLYCOPYRROLATE 0.4 MG: 0.2 INJECTION INTRAMUSCULAR; INTRAVENOUS at 09:00

## 2022-03-01 RX ADMIN — HYDROMORPHONE HYDROCHLORIDE 0.5 MG: 1 INJECTION, SOLUTION INTRAMUSCULAR; INTRAVENOUS; SUBCUTANEOUS at 10:10

## 2022-03-01 RX ADMIN — LABETALOL HYDROCHLORIDE 5 MG: 5 INJECTION, SOLUTION INTRAVENOUS at 07:46

## 2022-03-01 RX ADMIN — LABETALOL HYDROCHLORIDE 5 MG: 5 INJECTION, SOLUTION INTRAVENOUS at 08:54

## 2022-03-01 RX ADMIN — ROCURONIUM BROMIDE 50 MG: 50 INJECTION INTRAVENOUS at 07:33

## 2022-03-01 RX ADMIN — HYDRALAZINE HYDROCHLORIDE 5 MG: 20 INJECTION INTRAMUSCULAR; INTRAVENOUS at 09:52

## 2022-03-01 RX ADMIN — ONDANSETRON 4 MG: 2 INJECTION INTRAMUSCULAR; INTRAVENOUS at 08:53

## 2022-03-01 RX ADMIN — PROPOFOL 250 MG: 10 INJECTION, EMULSION INTRAVENOUS at 07:33

## 2022-03-01 NOTE — ANESTHESIA PREPROCEDURE EVALUATION
Anesthesia Evaluation     Patient summary reviewed and Nursing notes reviewed   no history of anesthetic complications:  NPO Solid Status: > 8 hours  NPO Liquid Status: > 2 hours           Airway   Mallampati: II  TM distance: >3 FB  Neck ROM: full  no difficulty expected  Dental - normal exam     Pulmonary - normal exam   (+) sleep apnea on CPAP,   (-) COPD, asthma, not a smoker, lung cancer  Cardiovascular - normal exam  Exercise tolerance: excellent (>7 METS)    ECG reviewed  Rhythm: regular  Rate: normal    (+) hypertension well controlled less than 2 medications,   (-) valvular problems/murmurs, past MI, CAD, dysrhythmias, angina, CHF, orthopnea, cardiac stents, CABG, pericardial effusion      Neuro/Psych  (+) psychiatric history Anxiety, ADD and ADHD,    (-) seizures, TIA, CVA  GI/Hepatic/Renal/Endo - negative ROS   (-) hiatal hernia, GERD, PUD, hepatitis, liver disease, no renal disease, diabetes, GI bleed, no thyroid disorder    Musculoskeletal (-) negative ROS    Abdominal  - normal exam   Substance History - negative use     OB/GYN negative ob/gyn ROS         Other      history of cancer remission                    Anesthesia Plan    ASA 2     general     intravenous induction     Anesthetic plan, all risks, benefits, and alternatives have been provided, discussed and informed consent has been obtained with: patient.    Plan discussed with CRNA.        CODE STATUS:

## 2022-03-01 NOTE — OP NOTE
[facility] OPERATIVE NOTE  3/1/2022    NAME: Shira Robbins    YOB: 1990  MRN: [id number]    PRE-OPERATIVE DIAGNOSIS: #1 septal deviation #2 turbinate hypertrophy #3 allergic rhinitis      POST-OPERATIVE DIAGNOSIS: same    PROCEDURE PERFORMED: #1 septoplasty CPT 40399 #2 bilateral submucous resection of the inferior turbinate CPT 3014 0-50 #3 nasal endoscopy CPT 16103    SURGEON: Grant Frye MD    ASSISTANT(S): None    ANESTHESIA: General Anesthesia via Endotracheal Tube    INDICATIONS: The patient is a 32-year-old woman with longstanding nasal obstruction.  She has tried nasal steroid spray and antihistamines without relief.  She continues to have nasal congestion and obstruction throughout the year.  She also has a history of YASMINE and has difficulty tolerating CPAP due to nasal obstruction.    FINDINGS: Septum was severely deviated with large inferior hypertrophic turbinates.    PROCEDURE:  Patient was brought to the operating room.  She was given general anesthesia and orally intubated.  Surgical timeout performed and all patient data verified.  Patient's nose was prepped and draped for nasal surgery.  Nasal mucosa vasoconstricted with adrenaline cottonoids.  Inferior turbinates and septum infiltrated with 1% lidocaine 1: 100,000 epinephrine.    Nasal endoscopy was performed.  The septum was quite thickened and deviated as noted as above.    The left inferior turbinate was incised at the anterior border.  Submucosal dissection was performed inferiorly and posteriorly.  Excess hypertrophic bone was removed with microdebrider.  Redundant mucosa was trimmed and the turbinate fractured laterally.  Same procedure was performed on the right lower turbinate.    Left hemitransfixion incision was made.  A mucoperichondrial flap was developed posteriorly.  Bony cartilaginous junction was identified.  Cut was made into the quadrangular cartilage a centimeter below the dorsal margin.   This extended forward about half a centimeter and then downward posteriorly.  A contralateral flap was elevated on the other side.  Portion of the posterior quadrangula cartilage was resected.  Posterior perpendicular plate of the ethmoid and vomer had to be resected as needed to allow for repositioning and midline alignment of the septum.  Scoring was performed on the anterior quadrangular cartilage.  Inspection showed improved airway.  To give additional support to the anterior septal cartilage harvested cartilage was placed on the left side and sutured with 3 interrupted 4-0 chromic's.  The mucosal incision was closed with 5-0 chromic.  4-0 plain gut on Dave needle was used for through and through mattress closure.  Nasapore packing was trimmed in place along the lower turbinates and floor the nose.  Matamoros splints were inserted on each side of the septum then positioned and secured anteriorly with a 4-0 nylon stitch.  1 small piece of packing was placed just above the Matamoros splint airway superiorly.      The patient tolerated the procedure well and was returned to the recovery room in satisfactory condition.    SPECIMENS: None    COMPLICATIONS: NONE    ESTIMATED BLOOD LOSS: Less than 50 cc    Grant Frye MD  3/1/2022

## 2022-03-01 NOTE — ANESTHESIA POSTPROCEDURE EVALUATION
Patient: Shira Robbins    Procedure Summary     Date: 03/01/22 Room / Location:  [facility]  [facility]    Anesthesia Start: 0726 Anesthesia Stop: 0920    Procedure: SEPTOPLASTY, SUBMUCOUS RESECTION INFERIOR TURBINATES AND NASAL ENDOSCOPY (Bilateral Nose) Diagnosis:     Surgeons: Grant Frye MD Provider: Thor Ruiz MD    Anesthesia Type: general ASA Status: 2          Anesthesia Type: general    Vitals  Vitals Value Taken Time   /101 03/01/22 1016   Temp 36.6 °C (97.9 °F) 03/01/22 1015   Pulse 79 03/01/22 1023   Resp 18 03/01/22 1015   SpO2 96 % 03/01/22 1023   Vitals shown include unvalidated device data.        Post Anesthesia Care and Evaluation    Patient location during evaluation: bedside  Patient participation: complete - patient participated  Level of consciousness: awake and alert  Pain score: 0  Pain management: adequate  Airway patency: patent  Anesthetic complications: No anesthetic complications    Cardiovascular status: acceptable  Respiratory status: acceptable  Hydration status: acceptable    Comments: BP (!) 160/101 (BP Location: Right arm, Patient Position: Lying)   Pulse 80   Temp 36.6 °C (97.9 °F) (Temporal)   Resp 18   LMP 02/07/2022 (Exact Date)   SpO2 95%

## 2022-03-01 NOTE — ANESTHESIA PROCEDURE NOTES
Airway  Urgency: elective    Date/Time: 3/1/2022 7:38 AM  Airway not difficult    General Information and Staff    Patient location during procedure: OR  Anesthesiologist: Thor Ruiz MD  CRNA: Angela Medina CRNA    Indications and Patient Condition  Indications for airway management: airway protection    Preoxygenated: yes  Mask difficulty assessment: 1 - vent by mask    Final Airway Details  Final airway type: endotracheal airway      Successful airway: ETT and CHANTAL tube  Cuffed: yes   Successful intubation technique: direct laryngoscopy  Facilitating devices/methods: intubating stylet  Endotracheal tube insertion site: oral  Blade: Rodriguez  Blade size: 2  ETT size (mm): 7.0  Cormack-Lehane Classification: grade I - full view of glottis  Placement verified by: chest auscultation and capnometry   Measured from: lips  Number of attempts at approach: 1  Assessment: lips, teeth, and gum same as pre-op and atraumatic intubation    Additional Comments  Atraumatic, MOP to cuff, BSBE, no change to dentition, secured with tape

## 2022-05-01 DIAGNOSIS — F98.8 ATTENTION DEFICIT DISORDER (ADD) WITHOUT HYPERACTIVITY: ICD-10-CM

## 2022-05-03 RX ORDER — DEXTROAMPHETAMINE SACCHARATE, AMPHETAMINE ASPARTATE MONOHYDRATE, DEXTROAMPHETAMINE SULFATE AND AMPHETAMINE SULFATE 2.5; 2.5; 2.5; 2.5 MG/1; MG/1; MG/1; MG/1
10 CAPSULE, EXTENDED RELEASE ORAL EVERY MORNING
Qty: 90 CAPSULE | Refills: 0 | Status: SHIPPED | OUTPATIENT
Start: 2022-05-03 | End: 2022-07-31 | Stop reason: SDUPTHER

## 2022-05-04 ENCOUNTER — TELEPHONE (OUTPATIENT)
Dept: FAMILY MEDICINE CLINIC | Facility: CLINIC | Age: 32
End: 2022-05-04

## 2022-05-04 NOTE — TELEPHONE ENCOUNTER
Caller: [facility]    Relationship: Other    Best call back number: [phone]    What was the call regarding: PATIENT NEEDS A PRIOR AUTH FOR HER amphetamine-dextroamphetamine XR (Adderall XR) 10 MG 24 hr capsule

## 2022-05-14 ENCOUNTER — PATIENT MESSAGE (OUTPATIENT)
Dept: FAMILY MEDICINE CLINIC | Facility: CLINIC | Age: 32
End: 2022-05-14

## 2022-05-18 ENCOUNTER — PRIOR AUTHORIZATION (OUTPATIENT)
Dept: FAMILY MEDICINE CLINIC | Facility: CLINIC | Age: 32
End: 2022-05-18

## 2022-05-23 ENCOUNTER — OFFICE VISIT (OUTPATIENT)
Dept: FAMILY MEDICINE CLINIC | Facility: CLINIC | Age: 32
End: 2022-05-23

## 2022-05-23 VITALS
WEIGHT: 168.2 LBS | HEART RATE: 84 BPM | SYSTOLIC BLOOD PRESSURE: 124 MMHG | OXYGEN SATURATION: 99 % | TEMPERATURE: 98 F | HEIGHT: 64 IN | DIASTOLIC BLOOD PRESSURE: 86 MMHG | BODY MASS INDEX: 28.71 KG/M2

## 2022-05-23 DIAGNOSIS — F98.8 ATTENTION DEFICIT DISORDER (ADD) WITHOUT HYPERACTIVITY: Primary | ICD-10-CM

## 2022-05-23 DIAGNOSIS — F41.9 ANXIETY: ICD-10-CM

## 2022-05-23 DIAGNOSIS — I10 PRIMARY HYPERTENSION: ICD-10-CM

## 2022-05-23 PROCEDURE — 99214 OFFICE O/P EST MOD 30 MIN: CPT | Performed by: FAMILY MEDICINE

## 2022-05-23 RX ORDER — LISINOPRIL 20 MG/1
20 TABLET ORAL DAILY
Qty: 90 TABLET | Refills: 1 | Status: SHIPPED | OUTPATIENT
Start: 2022-05-23 | End: 2022-12-05 | Stop reason: SDUPTHER
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
## 2022-05-25 LAB
ALBUMIN SERPL-MCNC: 4.4 G/DL (ref 3.8–4.8)
ALBUMIN/GLOB SERPL: 1.1 {RATIO} (ref 1.2–2.2)
ALP SERPL-CCNC: 72 IU/L (ref 44–121)
ALT SERPL-CCNC: 15 IU/L (ref 0–32)
APPEARANCE UR: CLEAR
AST SERPL-CCNC: 16 IU/L (ref 0–40)
BACTERIA #/AREA URNS HPF: NORMAL /[HPF]
BASOPHILS # BLD AUTO: 0 X10E3/UL (ref 0–0.2)
BASOPHILS NFR BLD AUTO: 0 %
BILIRUB SERPL-MCNC: 0.2 MG/DL (ref 0–1.2)
BILIRUB UR QL STRIP: NEGATIVE
BUN SERPL-MCNC: 19 MG/DL (ref 6–20)
BUN/CREAT SERPL: 14 (ref 9–23)
CALCIUM SERPL-MCNC: 9.7 MG/DL (ref 8.7–10.2)
CASTS URNS QL MICRO: NORMAL /LPF
CHLORIDE SERPL-SCNC: 103 MMOL/L (ref 96–106)
CHOLEST SERPL-MCNC: 182 MG/DL (ref 100–199)
CO2 SERPL-SCNC: 19 MMOL/L (ref 20–29)
COLOR UR: YELLOW
CREAT SERPL-MCNC: 1.38 MG/DL (ref 0.57–1)
EGFRCR SERPLBLD CKD-EPI 2021: 52 ML/MIN/1.73
EOSINOPHIL # BLD AUTO: 0.3 X10E3/UL (ref 0–0.4)
EOSINOPHIL NFR BLD AUTO: 4 %
EPI CELLS #/AREA URNS HPF: NORMAL /HPF (ref 0–10)
ERYTHROCYTE [DISTWIDTH] IN BLOOD BY AUTOMATED COUNT: 12.4 % (ref 11.7–15.4)
GLOBULIN SER CALC-MCNC: 3.9 G/DL (ref 1.5–4.5)
GLUCOSE SERPL-MCNC: 82 MG/DL (ref 65–99)
GLUCOSE UR QL STRIP: NEGATIVE
HCT VFR BLD AUTO: 40.8 % (ref 34–46.6)
HDLC SERPL-MCNC: 49 MG/DL
HGB BLD-MCNC: 13.3 G/DL (ref 11.1–15.9)
HGB UR QL STRIP: NEGATIVE
IMM GRANULOCYTES # BLD AUTO: 0 X10E3/UL (ref 0–0.1)
IMM GRANULOCYTES NFR BLD AUTO: 0 %
KETONES UR QL STRIP: NEGATIVE
LDLC SERPL CALC-MCNC: 119 MG/DL (ref 0–99)
LEUKOCYTE ESTERASE UR QL STRIP: ABNORMAL
LYMPHOCYTES # BLD AUTO: 1.6 X10E3/UL (ref 0.7–3.1)
LYMPHOCYTES NFR BLD AUTO: 23 %
MCH RBC QN AUTO: 29.4 PG (ref 26.6–33)
MCHC RBC AUTO-ENTMCNC: 32.6 G/DL (ref 31.5–35.7)
MCV RBC AUTO: 90 FL (ref 79–97)
MICRO URNS: ABNORMAL
MONOCYTES # BLD AUTO: 0.7 X10E3/UL (ref 0.1–0.9)
MONOCYTES NFR BLD AUTO: 10 %
NEUTROPHILS # BLD AUTO: 4.4 X10E3/UL (ref 1.4–7)
NEUTROPHILS NFR BLD AUTO: 63 %
NITRITE UR QL STRIP: NEGATIVE
PH UR STRIP: 7 [PH] (ref 5–7.5)
PLATELET # BLD AUTO: 322 X10E3/UL (ref 150–450)
POTASSIUM SERPL-SCNC: 3.9 MMOL/L (ref 3.5–5.2)
PROT SERPL-MCNC: 8.3 G/DL (ref 6–8.5)
PROT UR QL STRIP: ABNORMAL
RBC # BLD AUTO: 4.53 X10E6/UL (ref 3.77–5.28)
RBC #/AREA URNS HPF: NORMAL /HPF (ref 0–2)
SODIUM SERPL-SCNC: 137 MMOL/L (ref 134–144)
SP GR UR STRIP: 1 (ref 1–1.03)
TRIGL SERPL-MCNC: 75 MG/DL (ref 0–149)
TSH SERPL DL<=0.005 MIU/L-ACNC: 1.72 UIU/ML (ref 0.45–4.5)
UROBILINOGEN UR STRIP-MCNC: 0.2 MG/DL (ref 0.2–1)
VLDLC SERPL CALC-MCNC: 14 MG/DL (ref 5–40)
WBC # BLD AUTO: 7 X10E3/UL (ref 3.4–10.8)
WBC #/AREA URNS HPF: NORMAL /HPF (ref 0–5)

## 2022-06-17 ENCOUNTER — TELEMEDICINE (OUTPATIENT)
Dept: FAMILY MEDICINE CLINIC | Facility: CLINIC | Age: 32
End: 2022-06-17

## 2022-06-17 DIAGNOSIS — R42 VERTIGO: Primary | ICD-10-CM

## 2022-06-17 PROCEDURE — 99213 OFFICE O/P EST LOW 20 MIN: CPT | Performed by: NURSE PRACTITIONER

## 2022-06-17 RX ORDER — MECLIZINE HYDROCHLORIDE 25 MG/1
25 TABLET ORAL 3 TIMES DAILY PRN
Qty: 30 TABLET | Refills: 0 | Status: SHIPPED | OUTPATIENT
Start: 2022-06-17

## 2022-06-17 NOTE — PROGRESS NOTES
Chief Complaint  Dizziness (Vertigo)  You have chosen to receive care through a telehealth visit.  Do you consent to use a video/audio connection for your medical care today? Yes  Subjective          Shira Robbins presents to [facility]  Vertigo has increased in severity and occurrences over the last few months. Worsened this morning at 0400, dizziness, vertigo, left earache and nausea/vomiting. Typically will have vertigo about 3-4 times annually, hasn't had any occurrences this year until today. Hasn't taken any medication today due to the nausea and dizziness. Vertigo only occurs when changing position to standing or walking, when she lays down she feels like she is spinning. Denies any other symptoms.       Review of Systems   Constitutional: Negative for fatigue and fever.   HENT: Positive for ear pain (left). Negative for congestion, postnasal drip, rhinorrhea, sinus pressure and sneezing.    Eyes: Positive for visual disturbance. Negative for blurred vision.   Respiratory: Negative for shortness of breath and wheezing.    Cardiovascular: Negative for chest pain.   Gastrointestinal: Positive for nausea and vomiting. Negative for abdominal pain and diarrhea.   Neurological: Positive for dizziness and light-headedness. Negative for weakness and numbness.      Objective   Vital Signs:   There were no vitals taken for this visit.    Physical Exam  Constitutional:       General: She is awake.   Neurological:      Mental Status: She is alert.   Psychiatric:         Attention and Perception: Attention normal.         Speech: Speech normal.         Behavior: Behavior is cooperative.        Result Review :   The following data was reviewed by: FLACO Albarado on 06/17/2022:  CMP    CMP 2/25/22 5/24/22   Glucose 126 (A) 82   BUN 13 19   Creatinine 1.33 (A) 1.38 (A)   eGFR Non  Am 46 (A)    eGFR African Am 56 (A)    Sodium 138 137   Potassium 4.4 3.9   Chloride 107 103    Calcium 9.1 9.7   Total Protein  8.3   Albumin  4.4   Globulin  3.9   Total Bilirubin  0.2   Alkaline Phosphatase  72   AST (SGOT)  16   ALT (SGPT)  15   (A) Abnormal value             Assessment and Plan    Diagnoses and all orders for this visit:    1. Vertigo (Primary)  -     meclizine (ANTIVERT) 25 MG tablet; Take 1 tablet by mouth 3 (Three) Times a Day As Needed for Dizziness.  Dispense: 30 tablet; Refill: 0    Discussed with patient that she is ok to take Dramamine for motion sickness/nausea.  Prescribed meclizine for vertigo/dizziness.   Discussed with patient that if symptoms worsen or fail to improve within a few days, should call office to schedule in office follow up appointment for further testing and evaluation.    I spent 20 minutes caring for Shira on this date of service. This time includes time spent by me in the following activities:obtaining and/or reviewing a separately obtained history, counseling and educating the patient/family/caregiver, ordering medications, tests, or procedures and documenting information in the medical record     Follow Up   Return if symptoms worsen or fail to improve.  Patient was given instructions and counseling regarding her condition or for health maintenance advice. Please see specific information pulled into the AVS if appropriate.

## 2022-07-31 DIAGNOSIS — F98.8 ATTENTION DEFICIT DISORDER (ADD) WITHOUT HYPERACTIVITY: ICD-10-CM

## 2022-08-01 RX ORDER — DEXTROAMPHETAMINE SACCHARATE, AMPHETAMINE ASPARTATE MONOHYDRATE, DEXTROAMPHETAMINE SULFATE AND AMPHETAMINE SULFATE 2.5; 2.5; 2.5; 2.5 MG/1; MG/1; MG/1; MG/1
10 CAPSULE, EXTENDED RELEASE ORAL EVERY MORNING
Qty: 90 CAPSULE | Refills: 0 | Status: SHIPPED | OUTPATIENT
Start: 2022-08-01 | End: 2022-11-02 | Stop reason: SDUPTHER

## 2022-09-07 ENCOUNTER — TELEMEDICINE (OUTPATIENT)
Dept: FAMILY MEDICINE CLINIC | Facility: CLINIC | Age: 32
End: 2022-09-07

## 2022-09-07 DIAGNOSIS — M94.0 COSTOCHONDRITIS: Primary | ICD-10-CM

## 2022-09-07 PROCEDURE — 99213 OFFICE O/P EST LOW 20 MIN: CPT | Performed by: NURSE PRACTITIONER

## 2022-09-07 RX ORDER — TRAMADOL HYDROCHLORIDE 50 MG/1
50 TABLET ORAL EVERY 8 HOURS PRN
COMMUNITY
Start: 2022-04-04 | End: 2022-10-25 | Stop reason: SDUPTHER

## 2022-09-07 NOTE — TELEPHONE ENCOUNTER
Rx Refill Note  Requested Prescriptions     Pending Prescriptions Disp Refills   • traMADol (ULTRAM) 50 MG tablet 30 tablet 0     Sig: Take 1 tablet by mouth Every 8 (Eight) Hours As Needed for Moderate Pain.      Last office visit with prescribing clinician: 09/07/2022 TELEMEDICINE      Next office visit with prescribing clinician: Visit date not found

## 2022-09-07 NOTE — PROGRESS NOTES
Chief Complaint  Med Refill  You have chosen to receive care through a telephone visit. Do you consent to use a telephone visit for your medical care today? Yes  Subjective          Shira Robbisn presents to [facility]  Costochondritis-controlled with tramadol. Had issues with tramadol staying in medication list but has been taking it since 2020, stopped for about 2 years due to insurance issues, and now is back on tramadol and is requesting refill. Denies any side effects with tramadol.      Review of Systems   Constitutional: Negative for fatigue and fever.   Cardiovascular: Negative for chest pain and palpitations.   Psychiatric/Behavioral: Negative for depressed mood. The patient is not nervous/anxious.       Objective   Vital Signs:   There were no vitals taken for this visit.    Physical Exam  Constitutional:       General: She is awake.   Neurological:      Mental Status: She is alert.   Psychiatric:         Attention and Perception: Attention normal.         Speech: Speech normal.         Behavior: Behavior is cooperative.        Result Review :     Assessment and Plan    Diagnoses and all orders for this visit:    1. Costochondritis (Primary)    Discussed with patient that refill will be filled by another provider in office due to myself not having [place] license.      I spent 20 minutes caring for Shira on this date of service. This time includes time spent by me in the following activities:reviewing tests, obtaining and/or reviewing a separately obtained history, counseling and educating the patient/family/caregiver, documenting information in the medical record and care coordination     Follow Up   Return in about 3 months (around 12/7/2022) for Recheck.  Patient was given instructions and counseling regarding her condition or for health maintenance advice. Please see specific information pulled into the AVS if appropriate.

## 2022-09-08 RX ORDER — TRAMADOL HYDROCHLORIDE 50 MG/1
50 TABLET ORAL EVERY 8 HOURS PRN
Qty: 30 TABLET | Refills: 0 | OUTPATIENT
Start: 2022-09-08

## 2022-09-08 NOTE — TELEPHONE ENCOUNTER
This would be considered a as a new start and patient will need to be seen in person and have labs done and be evaluated.

## 2022-10-25 ENCOUNTER — TELEMEDICINE (OUTPATIENT)
Dept: FAMILY MEDICINE CLINIC | Facility: CLINIC | Age: 32
End: 2022-10-25

## 2022-10-25 DIAGNOSIS — G62.9 NEUROPATHY: Primary | ICD-10-CM

## 2022-10-25 DIAGNOSIS — L65.9 BALDING: ICD-10-CM

## 2022-10-25 PROCEDURE — 99214 OFFICE O/P EST MOD 30 MIN: CPT | Performed by: FAMILY MEDICINE

## 2022-10-25 RX ORDER — TRAMADOL HYDROCHLORIDE 50 MG/1
50 TABLET ORAL DAILY PRN
Qty: 30 TABLET | Refills: 0 | Status: SHIPPED | OUTPATIENT
Start: 2022-10-25 | End: 2022-11-02 | Stop reason: SDUPTHER
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
## 2022-10-28 ENCOUNTER — TELEMEDICINE (OUTPATIENT)
Dept: FAMILY MEDICINE CLINIC | Facility: TELEHEALTH | Age: 32
End: 2022-10-28

## 2022-10-28 DIAGNOSIS — N94.6 MENSTRUAL CRAMPS: Primary | ICD-10-CM

## 2022-10-28 PROCEDURE — 99212 OFFICE O/P EST SF 10 MIN: CPT | Performed by: NURSE PRACTITIONER

## 2022-10-28 RX ORDER — NAPROXEN 500 MG/1
500 TABLET ORAL 2 TIMES DAILY WITH MEALS
Qty: 60 TABLET | Refills: 0 | Status: SHIPPED | OUTPATIENT
Start: 2022-10-28 | End: 2022-11-27

## 2022-10-28 NOTE — PROGRESS NOTES
Chief Complaint   Patient presents with   • Menstrual Problem       Subjective   Shira Robbins is a 32 y.o. female.     History of Present Illness  Menstrual cramps for the last 2 month with periods that are severe to the point that she can't work. She missed work yesterday. She has used Ibuprofen and a heating pad, but has to lay in the bed. She has switched birth control and is currently on Depo injections and just had her 2nd injection. She is on day 3 of her monthly period. She is worried about missing work due to having these issues.  Menstrual Problem  This is a recurrent problem. The current episode started more than 1 month ago. The problem occurs constantly. The problem has been unchanged. Associated symptoms include abdominal pain, a change in bowel habit and fatigue. Pertinent negatives include no chills, fever, nausea, urinary symptoms, visual change or vomiting. The symptoms are aggravated by walking and standing. She has tried NSAIDs, heat and relaxation for the symptoms. The treatment provided no relief.       The following portions of the patient's history were reviewed and updated as appropriate: allergies, current medications, past medical history, and problem list.      Past Medical History:   Diagnosis Date   • ADHD (attention deficit hyperactivity disorder)    • Allergic    • Anxiety    • Cancer (HCC)    • Cervical cancer (HCC)     in remission    • COVID    • Hypertension     NO CURRENT MEDS   • Sleep apnea     NO DEVICE     Social History     Socioeconomic History   • Marital status: Single   Tobacco Use   • Smoking status: Never   • Smokeless tobacco: Never   Vaping Use   • Vaping Use: Never used   Substance and Sexual Activity   • Alcohol use: Yes     Comment: occ   • Drug use: No   • Sexual activity: Defer     medication documentation: reviewed and updated with patient and   Current Outpatient Medications:   •  amphetamine-dextroamphetamine XR (Adderall XR) 10 MG 24 hr capsule, Take 1  capsule by mouth Every Morning, Disp: 90 capsule, Rfl: 0  •  cetirizine (zyrTEC) 10 MG tablet, Take 1 tablet by mouth Daily., Disp: 90 tablet, Rfl: 3  •  HYDROcodone-acetaminophen (NORCO) 5-325 MG per tablet, Take 1 tablet by mouth Every 4 (Four) Hours As Needed for Pain., Disp: 15 tablet, Rfl: 0  •  lisinopril (PRINIVIL,ZESTRIL) 20 MG tablet, Take 1 tablet by mouth Daily., Disp: 90 tablet, Rfl: 1  •  meclizine (ANTIVERT) 25 MG tablet, Take 1 tablet by mouth 3 (Three) Times a Day As Needed for Dizziness., Disp: 30 tablet, Rfl: 0  •  naproxen (Naprosyn) 500 MG tablet, Take 1 tablet by mouth 2 (Two) Times a Day With Meals for 30 days., Disp: 60 tablet, Rfl: 0  •  norgestimate-ethinyl estradiol (Sprintec 28) 0.25-35 MG-MCG per tablet, Take 1 tablet by mouth Daily., Disp: 90 tablet, Rfl: 3  •  Sodium Fluoride (PreviDent 5000 Dry Mouth) 1.1 % gel, Use as directed twice daily., Disp: 100 mL, Rfl: 0  •  traMADol (ULTRAM) 50 MG tablet, Take 1 tablet by mouth Daily As Needed for Moderate Pain., Disp: 30 tablet, Rfl: 0  •  triamcinolone (KENALOG) 0.1 % cream, Apply  topically to the appropriate area two times a day as directed., Disp: 454 g, Rfl: 0  •  venlafaxine XR (Effexor XR) 75 MG 24 hr capsule, Take 1 capsule by mouth Daily., Disp: 90 capsule, Rfl: 3  Review of Systems   Constitutional: Positive for fatigue. Negative for chills and fever.   Gastrointestinal: Positive for abdominal pain and change in bowel habit. Negative for nausea and vomiting.   Genitourinary: Positive for menstrual problem, pelvic pain and vaginal bleeding (on her monthly menses). Negative for difficulty urinating, dysuria, flank pain and frequency.   Musculoskeletal: Positive for back pain.       Objective   Physical Exam  Constitutional:       Appearance: She is not ill-appearing.   Pulmonary:      Effort: Pulmonary effort is normal.   Abdominal:      General: There is no distension.   Neurological:      Mental Status: She is alert.          Assessment & Plan   Diagnoses and all orders for this visit:    1. Menstrual cramps (Primary)  -     naproxen (Naprosyn) 500 MG tablet; Take 1 tablet by mouth 2 (Two) Times a Day With Meals for 30 days.  Dispense: 60 tablet; Refill: 0                    Follow Up:  If your symptoms are not resolving by the completion of your treatment or are worsening, see your primary care provider for follow up. If you don't have a primary care provider, you may go to any Urgent Care for re-evaluation. If you develop any life threatening symptoms, go to the nearest Emergency Department immediately or call EMS.               The use of  Video Visit was utilized during this visit, using both MyChart and Zoom. The use of a video visit has been reviewed with the patient and verbal informed consent has been obtained. No technical difficulties occurred during the visit.    is located at [address] [place] [address] [address] [facility] [address]  Provider is located at [place], [place]

## 2022-11-02 ENCOUNTER — OFFICE VISIT (OUTPATIENT)
Dept: FAMILY MEDICINE CLINIC | Facility: CLINIC | Age: 32
End: 2022-11-02

## 2022-11-02 VITALS
HEIGHT: 64 IN | BODY MASS INDEX: 28.17 KG/M2 | WEIGHT: 165 LBS | SYSTOLIC BLOOD PRESSURE: 110 MMHG | OXYGEN SATURATION: 98 % | HEART RATE: 83 BPM | DIASTOLIC BLOOD PRESSURE: 86 MMHG | TEMPERATURE: 97.5 F

## 2022-11-02 DIAGNOSIS — F98.8 ATTENTION DEFICIT DISORDER (ADD) WITHOUT HYPERACTIVITY: ICD-10-CM

## 2022-11-02 DIAGNOSIS — G62.9 NEUROPATHY: ICD-10-CM

## 2022-11-02 DIAGNOSIS — N92.0 MENORRHAGIA WITH REGULAR CYCLE: ICD-10-CM

## 2022-11-02 DIAGNOSIS — L65.9 BALDING: ICD-10-CM

## 2022-11-02 DIAGNOSIS — L63.9 ALOPECIA AREATA: Primary | ICD-10-CM

## 2022-11-02 DIAGNOSIS — F41.9 ANXIETY: ICD-10-CM

## 2022-11-02 PROCEDURE — 99214 OFFICE O/P EST MOD 30 MIN: CPT | Performed by: FAMILY MEDICINE

## 2022-11-02 RX ORDER — CETIRIZINE HYDROCHLORIDE 10 MG/1
10 TABLET ORAL DAILY
Qty: 90 TABLET | Refills: 3 | Status: SHIPPED | OUTPATIENT
Start: 2022-11-02

## 2022-11-02 RX ORDER — DEXTROAMPHETAMINE SACCHARATE, AMPHETAMINE ASPARTATE MONOHYDRATE, DEXTROAMPHETAMINE SULFATE AND AMPHETAMINE SULFATE 2.5; 2.5; 2.5; 2.5 MG/1; MG/1; MG/1; MG/1
10 CAPSULE, EXTENDED RELEASE ORAL EVERY MORNING
Qty: 90 CAPSULE | Refills: 0 | Status: SHIPPED | OUTPATIENT
Start: 2022-11-02 | End: 2022-12-05 | Stop reason: SDUPTHER

## 2022-11-02 RX ORDER — TRAMADOL HYDROCHLORIDE 50 MG/1
50 TABLET ORAL DAILY PRN
Qty: 30 TABLET | Refills: 0 | Status: SHIPPED | OUTPATIENT
Start: 2022-11-02 | End: 2022-12-05 | Stop reason: SDUPTHER

## 2022-11-02 RX ORDER — VENLAFAXINE HYDROCHLORIDE 75 MG/1
75 CAPSULE, EXTENDED RELEASE ORAL DAILY
Qty: 90 CAPSULE | Refills: 3 | Status: SHIPPED | OUTPATIENT
Start: 2022-11-02 | End: 2022-12-05 | Stop reason: SDUPTHER
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
## 2022-11-03 ENCOUNTER — PATIENT MESSAGE (OUTPATIENT)
Dept: FAMILY MEDICINE CLINIC | Facility: CLINIC | Age: 32
End: 2022-11-03

## 2022-11-03 LAB
BASOPHILS # BLD AUTO: 0.04 10*3/MM3 (ref 0–0.2)
BASOPHILS NFR BLD AUTO: 0.7 % (ref 0–1.5)
EOSINOPHIL # BLD AUTO: 0.22 10*3/MM3 (ref 0–0.4)
EOSINOPHIL NFR BLD AUTO: 3.8 % (ref 0.3–6.2)
ERYTHROCYTE [DISTWIDTH] IN BLOOD BY AUTOMATED COUNT: 12.7 % (ref 12.3–15.4)
HCT VFR BLD AUTO: 41 % (ref 34–46.6)
HGB BLD-MCNC: 13.6 G/DL (ref 12–15.9)
IMM GRANULOCYTES # BLD AUTO: 0.02 10*3/MM3 (ref 0–0.05)
IMM GRANULOCYTES NFR BLD AUTO: 0.3 % (ref 0–0.5)
IRON SATN MFR SERPL: 27 % (ref 20–50)
IRON SERPL-MCNC: 102 MCG/DL (ref 37–145)
LYMPHOCYTES # BLD AUTO: 1.69 10*3/MM3 (ref 0.7–3.1)
LYMPHOCYTES NFR BLD AUTO: 29.2 % (ref 19.6–45.3)
MCH RBC QN AUTO: 30.7 PG (ref 26.6–33)
MCHC RBC AUTO-ENTMCNC: 33.2 G/DL (ref 31.5–35.7)
MCV RBC AUTO: 92.6 FL (ref 79–97)
MONOCYTES # BLD AUTO: 0.68 10*3/MM3 (ref 0.1–0.9)
MONOCYTES NFR BLD AUTO: 11.7 % (ref 5–12)
NEUTROPHILS # BLD AUTO: 3.14 10*3/MM3 (ref 1.7–7)
NEUTROPHILS NFR BLD AUTO: 54.3 % (ref 42.7–76)
NRBC BLD AUTO-RTO: 0 /100 WBC (ref 0–0.2)
PLATELET # BLD AUTO: 345 10*3/MM3 (ref 140–450)
RBC # BLD AUTO: 4.43 10*6/MM3 (ref 3.77–5.28)
TIBC SERPL-MCNC: 374 MCG/DL
TSH SERPL DL<=0.005 MIU/L-ACNC: 1.22 UIU/ML (ref 0.27–4.2)
UIBC SERPL-MCNC: 272 MCG/DL (ref 112–346)
WBC # BLD AUTO: 5.79 10*3/MM3 (ref 3.4–10.8)

## 2022-11-07 ENCOUNTER — TELEPHONE (OUTPATIENT)
Dept: FAMILY MEDICINE CLINIC | Facility: CLINIC | Age: 32
End: 2022-11-07

## 2022-11-07 NOTE — TELEPHONE ENCOUNTER
Caller: [facility]     Relationship: [unfilled]     Best call back number: [phone]    What is your medical concern? A PRIOR AUTHORIZATION IS NEEDED FOR amphetamine-dextroamphetamine XR (Adderall XR) 10 MG 24 hr capsule     FAX: [phone]

## 2022-11-10 ENCOUNTER — TELEPHONE (OUTPATIENT)
Dept: FAMILY MEDICINE CLINIC | Facility: CLINIC | Age: 32
End: 2022-11-10

## 2022-11-10 NOTE — TELEPHONE ENCOUNTER
Milena with [facility] called about patient's Gynecology referral. She stated the patient was contacted thrice about it and no appointment was made so they are closing it.

## 2022-11-18 NOTE — TELEPHONE ENCOUNTER
PATIENT IS CALLING IN SHE STILL HAS NO MEDICATION AT PHARMACY SHE STATES THAT IT SAYS FILL IS IN PROGRESS SINCE November 2ND AND SHE IS NOT SURE WHAT IS GOING ON.    PLEASE ADVISE    CALLBACK NUMBER IS  [phone]

## 2022-11-18 NOTE — TELEPHONE ENCOUNTER
Spoke with patient to inform this did not need a PA. This was sent to the pharmacy same day as requested. Pharmacy was not getting back with patient to inform her the meds were filled and ready.     Called pharmacy for patient meds are ready and patient was informed and understands

## 2022-12-05 ENCOUNTER — OFFICE VISIT (OUTPATIENT)
Dept: FAMILY MEDICINE CLINIC | Facility: CLINIC | Age: 32
End: 2022-12-05

## 2022-12-05 VITALS
BODY MASS INDEX: 27.66 KG/M2 | WEIGHT: 162 LBS | HEART RATE: 83 BPM | HEIGHT: 64 IN | TEMPERATURE: 97.7 F | SYSTOLIC BLOOD PRESSURE: 120 MMHG | DIASTOLIC BLOOD PRESSURE: 98 MMHG | OXYGEN SATURATION: 99 %

## 2022-12-05 DIAGNOSIS — H10.13 ALLERGIC CONJUNCTIVITIS OF BOTH EYES: ICD-10-CM

## 2022-12-05 DIAGNOSIS — Z30.8 ENCOUNTER FOR OTHER CONTRACEPTIVE MANAGEMENT: Primary | ICD-10-CM

## 2022-12-05 DIAGNOSIS — F98.8 ATTENTION DEFICIT DISORDER (ADD) WITHOUT HYPERACTIVITY: ICD-10-CM

## 2022-12-05 DIAGNOSIS — I10 PRIMARY HYPERTENSION: ICD-10-CM

## 2022-12-05 DIAGNOSIS — F41.9 ANXIETY: ICD-10-CM

## 2022-12-05 DIAGNOSIS — G62.9 NEUROPATHY: ICD-10-CM

## 2022-12-05 PROCEDURE — 99395 PREV VISIT EST AGE 18-39: CPT | Performed by: FAMILY MEDICINE

## 2022-12-05 RX ORDER — MEDROXYPROGESTERONE ACETATE 150 MG/ML
150 INJECTION, SUSPENSION INTRAMUSCULAR
Qty: 1 EACH | Refills: 3 | Status: SHIPPED | OUTPATIENT
Start: 2022-12-05

## 2022-12-05 RX ORDER — TRAMADOL HYDROCHLORIDE 50 MG/1
50 TABLET ORAL DAILY PRN
Qty: 30 TABLET | Refills: 0 | Status: SHIPPED | OUTPATIENT
Start: 2022-12-05 | End: 2023-02-17 | Stop reason: SDUPTHER

## 2022-12-05 RX ORDER — OLOPATADINE HYDROCHLORIDE 2 MG/ML
1 SOLUTION/ DROPS OPHTHALMIC DAILY
Qty: 1 EACH | Refills: 5 | Status: SHIPPED | OUTPATIENT
Start: 2022-12-05

## 2022-12-05 RX ORDER — VENLAFAXINE HYDROCHLORIDE 75 MG/1
75 CAPSULE, EXTENDED RELEASE ORAL DAILY
Qty: 90 CAPSULE | Refills: 3 | Status: SHIPPED | OUTPATIENT
Start: 2022-12-05 | End: 2023-02-17 | Stop reason: SDUPTHER

## 2022-12-05 RX ORDER — DEXTROAMPHETAMINE SACCHARATE, AMPHETAMINE ASPARTATE MONOHYDRATE, DEXTROAMPHETAMINE SULFATE AND AMPHETAMINE SULFATE 2.5; 2.5; 2.5; 2.5 MG/1; MG/1; MG/1; MG/1
10 CAPSULE, EXTENDED RELEASE ORAL EVERY MORNING
Qty: 90 CAPSULE | Refills: 0 | Status: SHIPPED | OUTPATIENT
Start: 2022-12-05 | End: 2022-12-05 | Stop reason: SDUPTHER

## 2022-12-05 RX ORDER — LISINOPRIL 20 MG/1
20 TABLET ORAL DAILY
Qty: 90 TABLET | Refills: 1 | Status: SHIPPED | OUTPATIENT
Start: 2022-12-05

## 2022-12-05 RX ORDER — DEXTROAMPHETAMINE SACCHARATE, AMPHETAMINE ASPARTATE MONOHYDRATE, DEXTROAMPHETAMINE SULFATE AND AMPHETAMINE SULFATE 2.5; 2.5; 2.5; 2.5 MG/1; MG/1; MG/1; MG/1
10 CAPSULE, EXTENDED RELEASE ORAL EVERY MORNING
Qty: 30 CAPSULE | Refills: 0 | Status: SHIPPED | OUTPATIENT
Start: 2022-12-05 | End: 2023-02-17 | Stop reason: SDUPTHER

## 2022-12-05 NOTE — PROGRESS NOTES
"Shira Robbins is here today for an annual physical exam.     Eating a healthy diet. Exercising routinely.   Regular periods. Wears seat belt. Feels safe at home.   Sexual activity:no  Birth control:is on [facility] shot[facility] W[facility] now. Does not need till boris.   Pregnancy:   Sexual and gender orientation: heterosexual female    PHQ-2 Depression Screening  Little interest or pleasure in doing things? 0-->not at all   Feeling down, depressed, or hopeless? 0-->not at all   PHQ-2 Total Score 0         I have reviewed the patient's medical, family, and social history in detail and updated the computerized patient record.    Screening history:  Colonoscopy - not yet due  Mammogram- not yet due, Pap/pelvic - utd  Metabolic - utd and reviewed, pt asking for this again.     Health Maintenance   Topic Date Due   • TDAP/TD VACCINES (1 - Tdap) Never done   • HEPATITIS C SCREENING  Never done   • ANNUAL PHYSICAL  Never done   • COVID-19 Vaccine (3 - Booster for Pfizer series) 2021   • INFLUENZA VACCINE  2022   • PAP SMEAR  2023   • Pneumococcal Vaccine 0-64  Aged Out       Review of Systems   Constitutional: Negative for diaphoresis, fatigue and fever.   Respiratory: Negative for shortness of breath.    Cardiovascular: Negative for chest pain and palpitations.   Gastrointestinal: Negative for abdominal pain, nausea and vomiting.   Musculoskeletal: Negative for back pain and neck pain.   Neurological: Negative for dizziness, syncope, weakness, light-headedness and headaches.   Psychiatric/Behavioral: Negative for confusion.       /98 (BP Location: Left arm, Patient Position: Sitting)   Pulse 83   Temp 97.7 °F (36.5 °C)   Ht 162.6 cm (64.02\")   Wt 73.5 kg (162 lb)   SpO2 99%   BMI 27.79 kg/m²      Physical Exam    Vital signs reviewed.  General appearance: No acute distress  Eyes: conjunctiva clear without erythema; pupils equally round and reactive  ENT: " external ears and nose normal; hearing normal, oropharynx clear  Neck: supple; no thyromegaly  CV: normal rate and rhythm; no peripheral edema  Respiratory: normal respiratory effort; lungs clear to auscultation bilaterally  MSK: normal gait and station; no focal joint deformity or swelling  Skin: no rash or wounds; normal turgor  Neuro: cranial nerves 2-12 grossly intact; normal sensation to light touch  Psych: mood and affect normal; recent and remote memory intact    No visits with results within 2 Week(s) from this visit.   Latest known visit with results is:   Office Visit on 11/02/2022   Component Date Value Ref Range Status   • TIBC 11/02/2022 374  mcg/dL Final   • UIBC 11/02/2022 272  112 - 346 mcg/dL Final   • Iron 11/02/2022 102  37 - 145 mcg/dL Final   • Iron Saturation 11/02/2022 27  20 - 50 % Final   • WBC 11/02/2022 5.79  3.40 - 10.80 10*3/mm3 Final   • RBC 11/02/2022 4.43  3.77 - 5.28 10*6/mm3 Final   • Hemoglobin 11/02/2022 13.6  12.0 - 15.9 g/dL Final   • Hematocrit 11/02/2022 41.0  34.0 - 46.6 % Final   • MCV 11/02/2022 92.6  79.0 - 97.0 fL Final   • MCH 11/02/2022 30.7  26.6 - 33.0 pg Final   • MCHC 11/02/2022 33.2  31.5 - 35.7 g/dL Final   • RDW 11/02/2022 12.7  12.3 - 15.4 % Final   • Platelets 11/02/2022 345  140 - 450 10*3/mm3 Final   • Neutrophil Rel % 11/02/2022 54.3  42.7 - 76.0 % Final   • Lymphocyte Rel % 11/02/2022 29.2  19.6 - 45.3 % Final   • Monocyte Rel % 11/02/2022 11.7  5.0 - 12.0 % Final   • Eosinophil Rel % 11/02/2022 3.8  0.3 - 6.2 % Final   • Basophil Rel % 11/02/2022 0.7  0.0 - 1.5 % Final   • Neutrophils Absolute 11/02/2022 3.14  1.70 - 7.00 10*3/mm3 Final   • Lymphocytes Absolute 11/02/2022 1.69  0.70 - 3.10 10*3/mm3 Final   • Monocytes Absolute 11/02/2022 0.68  0.10 - 0.90 10*3/mm3 Final   • Eosinophils Absolute 11/02/2022 0.22  0.00 - 0.40 10*3/mm3 Final   • Basophils Absolute 11/02/2022 0.04  0.00 - 0.20 10*3/mm3 Final   • Immature Granulocyte Rel % 11/02/2022 0.3  0.0 -  0.5 % Final   • Immature Grans Absolute 11/02/2022 0.02  0.00 - 0.05 10*3/mm3 Final   • nRBC 11/02/2022 0.0  0.0 - 0.2 /100 WBC Final   • TSH 11/02/2022 1.220  0.270 - 4.200 uIU/mL Final         Current Outpatient Medications:   •  amphetamine-dextroamphetamine XR (Adderall XR) 10 MG 24 hr capsule, Take 1 capsule by mouth Every Morning, Disp: 90 capsule, Rfl: 0  •  cetirizine (zyrTEC) 10 MG tablet, Take 1 tablet by mouth Daily., Disp: 90 tablet, Rfl: 3  •  HYDROcodone-acetaminophen (NORCO) 5-325 MG per tablet, Take 1 tablet by mouth Every 4 (Four) Hours As Needed for Pain., Disp: 15 tablet, Rfl: 0  •  lisinopril (PRINIVIL,ZESTRIL) 20 MG tablet, Take 1 tablet by mouth Daily., Disp: 90 tablet, Rfl: 1  •  meclizine (ANTIVERT) 25 MG tablet, Take 1 tablet by mouth 3 (Three) Times a Day As Needed for Dizziness., Disp: 30 tablet, Rfl: 0  •  Sodium Fluoride (PreviDent 5000 Dry Mouth) 1.1 % gel, Use as directed twice daily., Disp: 100 mL, Rfl: 0  •  traMADol (ULTRAM) 50 MG tablet, Take 1 tablet by mouth Daily As Needed for Moderate Pain., Disp: 30 tablet, Rfl: 0  •  triamcinolone (KENALOG) 0.1 % cream, Apply  topically to the appropriate area two times a day as directed., Disp: 454 g, Rfl: 0  •  venlafaxine XR (Effexor XR) 75 MG 24 hr capsule, Take 1 capsule by mouth Daily., Disp: 90 capsule, Rfl: 3  •  medroxyPROGESTERone (DEPO-PROVERA) 150 MG/ML injection, Inject 1 mL into the appropriate muscle as directed by prescriber Every 3 (Three) Months., Disp: 1 each, Rfl: 3  •  olopatadine (PATADAY) 0.2 % solution ophthalmic solution, Administer 1 drop to both eyes Daily., Disp: 1 each, Rfl: 5    Diagnoses and all orders for this visit:    1. Encounter for other contraceptive management (Primary)  -     medroxyPROGESTERone (DEPO-PROVERA) 150 MG/ML injection; Inject 1 mL into the appropriate muscle as directed by prescriber Every 3 (Three) Months.  Dispense: 1 each; Refill: 3    2. Anxiety  -     venlafaxine XR (Effexor XR) 75 MG  24 hr capsule; Take 1 capsule by mouth Daily.  Dispense: 90 capsule; Refill: 3    3. Neuropathy  -     traMADol (ULTRAM) 50 MG tablet; Take 1 tablet by mouth Daily As Needed for Moderate Pain.  Dispense: 30 tablet; Refill: 0    4. Primary hypertension  -     lisinopril (PRINIVIL,ZESTRIL) 20 MG tablet; Take 1 tablet by mouth Daily.  Dispense: 90 tablet; Refill: 1  -     Comprehensive Metabolic Panel  -     CBC & Differential  -     Lipid Panel    5. Attention deficit disorder (ADD) without hyperactivity  -     amphetamine-dextroamphetamine XR (Adderall XR) 10 MG 24 hr capsule; Take 1 capsule by mouth Every Morning  Dispense: 90 capsule; Refill: 0    6. Allergic conjunctivitis of both eyes  -     olopatadine (PATADAY) 0.2 % solution ophthalmic solution; Administer 1 drop to both eyes Daily.  Dispense: 1 each; Refill: 5        Encourage healthy diet and exercise.  Encourage patient to stay up to date on screening examinations as indicated based on age and risk factors. F/U yearly.     jessa Reilly, can try topical steroids until she gets into derm next week. Date of last depo shot was 10/24/22.

## 2022-12-06 LAB
ALBUMIN SERPL-MCNC: 4.5 G/DL (ref 3.5–5.2)
ALBUMIN/GLOB SERPL: 1.2 G/DL
ALP SERPL-CCNC: 79 U/L (ref 39–117)
ALT SERPL-CCNC: 41 U/L (ref 1–33)
AST SERPL-CCNC: 27 U/L (ref 1–32)
BASOPHILS # BLD AUTO: 0.01 10*3/MM3 (ref 0–0.2)
BASOPHILS NFR BLD AUTO: 0.2 % (ref 0–1.5)
BILIRUB SERPL-MCNC: 0.4 MG/DL (ref 0–1.2)
BUN SERPL-MCNC: 21 MG/DL (ref 6–20)
BUN/CREAT SERPL: 14.2 (ref 7–25)
CALCIUM SERPL-MCNC: 9.9 MG/DL (ref 8.6–10.5)
CHLORIDE SERPL-SCNC: 110 MMOL/L (ref 98–107)
CHOLEST SERPL-MCNC: 187 MG/DL (ref 0–200)
CO2 SERPL-SCNC: 19.5 MMOL/L (ref 22–29)
CREAT SERPL-MCNC: 1.48 MG/DL (ref 0.57–1)
EGFRCR SERPLBLD CKD-EPI 2021: 47.8 ML/MIN/1.73
EOSINOPHIL # BLD AUTO: 0.16 10*3/MM3 (ref 0–0.4)
EOSINOPHIL NFR BLD AUTO: 3.4 % (ref 0.3–6.2)
ERYTHROCYTE [DISTWIDTH] IN BLOOD BY AUTOMATED COUNT: 12.4 % (ref 12.3–15.4)
GLOBULIN SER CALC-MCNC: 3.8 GM/DL
GLUCOSE SERPL-MCNC: 88 MG/DL (ref 65–99)
HCT VFR BLD AUTO: 41 % (ref 34–46.6)
HDLC SERPL-MCNC: 51 MG/DL (ref 40–60)
HGB BLD-MCNC: 13.4 G/DL (ref 12–15.9)
IMM GRANULOCYTES # BLD AUTO: 0.02 10*3/MM3 (ref 0–0.05)
IMM GRANULOCYTES NFR BLD AUTO: 0.4 % (ref 0–0.5)
LDLC SERPL CALC-MCNC: 121 MG/DL (ref 0–100)
LYMPHOCYTES # BLD AUTO: 0.96 10*3/MM3 (ref 0.7–3.1)
LYMPHOCYTES NFR BLD AUTO: 20.2 % (ref 19.6–45.3)
MCH RBC QN AUTO: 29.3 PG (ref 26.6–33)
MCHC RBC AUTO-ENTMCNC: 32.7 G/DL (ref 31.5–35.7)
MCV RBC AUTO: 89.7 FL (ref 79–97)
MONOCYTES # BLD AUTO: 0.45 10*3/MM3 (ref 0.1–0.9)
MONOCYTES NFR BLD AUTO: 9.5 % (ref 5–12)
NEUTROPHILS # BLD AUTO: 3.15 10*3/MM3 (ref 1.7–7)
NEUTROPHILS NFR BLD AUTO: 66.3 % (ref 42.7–76)
NRBC BLD AUTO-RTO: 0 /100 WBC (ref 0–0.2)
PLATELET # BLD AUTO: 340 10*3/MM3 (ref 140–450)
POTASSIUM SERPL-SCNC: 4.9 MMOL/L (ref 3.5–5.2)
PROT SERPL-MCNC: 8.3 G/DL (ref 6–8.5)
RBC # BLD AUTO: 4.57 10*6/MM3 (ref 3.77–5.28)
SODIUM SERPL-SCNC: 142 MMOL/L (ref 136–145)
TRIGL SERPL-MCNC: 81 MG/DL (ref 0–150)
VLDLC SERPL CALC-MCNC: 15 MG/DL (ref 5–40)
WBC # BLD AUTO: 4.75 10*3/MM3 (ref 3.4–10.8)

## 2023-01-20 ENCOUNTER — TELEPHONE (OUTPATIENT)
Dept: FAMILY MEDICINE CLINIC | Facility: CLINIC | Age: 33
End: 2023-01-20
Payer: COMMERCIAL

## 2023-01-20 NOTE — TELEPHONE ENCOUNTER
The patient called to give Dr. Morin some more information with regards to the paperwork from [facility] for the patient's scalp inflammation. The patient said that the reference number, [id number], is at the bottom of the paper. She also said that they are needing the dates for the leave to be 11/18/2022 thru 5/18/2023. That way it covers the 11/18/22 & 1/12/23 dates of the scalp inflammation. If there are any questions, please call the patient at [phone].

## 2023-02-16 DIAGNOSIS — F98.8 ATTENTION DEFICIT DISORDER (ADD) WITHOUT HYPERACTIVITY: ICD-10-CM

## 2023-02-16 RX ORDER — DEXTROAMPHETAMINE SACCHARATE, AMPHETAMINE ASPARTATE MONOHYDRATE, DEXTROAMPHETAMINE SULFATE AND AMPHETAMINE SULFATE 2.5; 2.5; 2.5; 2.5 MG/1; MG/1; MG/1; MG/1
10 CAPSULE, EXTENDED RELEASE ORAL EVERY MORNING
Qty: 30 CAPSULE | Refills: 0 | Status: CANCELLED | OUTPATIENT
Start: 2023-02-16

## 2023-02-17 ENCOUNTER — OFFICE VISIT (OUTPATIENT)
Dept: FAMILY MEDICINE CLINIC | Facility: CLINIC | Age: 33
End: 2023-02-17
Payer: COMMERCIAL

## 2023-02-17 VITALS
DIASTOLIC BLOOD PRESSURE: 80 MMHG | OXYGEN SATURATION: 99 % | BODY MASS INDEX: 27.66 KG/M2 | WEIGHT: 162 LBS | HEART RATE: 80 BPM | SYSTOLIC BLOOD PRESSURE: 124 MMHG | HEIGHT: 64 IN | TEMPERATURE: 97.8 F

## 2023-02-17 DIAGNOSIS — G62.9 NEUROPATHY: ICD-10-CM

## 2023-02-17 DIAGNOSIS — Z30.011 ENCOUNTER FOR INITIAL PRESCRIPTION OF CONTRACEPTIVE PILLS: Primary | ICD-10-CM

## 2023-02-17 DIAGNOSIS — F41.9 ANXIETY: ICD-10-CM

## 2023-02-17 DIAGNOSIS — F98.8 ATTENTION DEFICIT DISORDER (ADD) WITHOUT HYPERACTIVITY: ICD-10-CM

## 2023-02-17 LAB
B-HCG UR QL: NEGATIVE
EXPIRATION DATE: NORMAL
INTERNAL NEGATIVE CONTROL: NORMAL
INTERNAL POSITIVE CONTROL: NORMAL
Lab: NORMAL

## 2023-02-17 PROCEDURE — 81025 URINE PREGNANCY TEST: CPT | Performed by: FAMILY MEDICINE

## 2023-02-17 PROCEDURE — 99214 OFFICE O/P EST MOD 30 MIN: CPT | Performed by: FAMILY MEDICINE

## 2023-02-17 RX ORDER — VENLAFAXINE HYDROCHLORIDE 75 MG/1
75 CAPSULE, EXTENDED RELEASE ORAL DAILY
Qty: 90 CAPSULE | Refills: 3 | Status: SHIPPED | OUTPATIENT
Start: 2023-02-17

## 2023-02-17 RX ORDER — CHLORCYCLIZINE HYDROCHLORIDE AND PSEUDOEPHEDRINE HYDROCHLORIDE 25; 60 MG/1; MG/1
1 TABLET ORAL DAILY PRN
Qty: 90 TABLET | Refills: 1 | Status: SHIPPED | OUTPATIENT
Start: 2023-02-17

## 2023-02-17 RX ORDER — TRAMADOL HYDROCHLORIDE 50 MG/1
50 TABLET ORAL DAILY PRN
Qty: 30 TABLET | Refills: 0 | Status: SHIPPED | OUTPATIENT
Start: 2023-02-17

## 2023-02-17 RX ORDER — DEXTROAMPHETAMINE SACCHARATE, AMPHETAMINE ASPARTATE MONOHYDRATE, DEXTROAMPHETAMINE SULFATE AND AMPHETAMINE SULFATE 2.5; 2.5; 2.5; 2.5 MG/1; MG/1; MG/1; MG/1
10 CAPSULE, EXTENDED RELEASE ORAL EVERY MORNING
Qty: 30 CAPSULE | Refills: 0 | Status: SHIPPED | OUTPATIENT
Start: 2023-02-17 | End: 2023-04-01 | Stop reason: SDUPTHER
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
## 2023-03-24 DIAGNOSIS — I10 PRIMARY HYPERTENSION: ICD-10-CM

## 2023-03-24 RX ORDER — LISINOPRIL 20 MG/1
20 TABLET ORAL DAILY
Qty: 90 TABLET | Refills: 1 | Status: SHIPPED | OUTPATIENT
Start: 2023-03-24

## 2023-04-01 DIAGNOSIS — F98.8 ATTENTION DEFICIT DISORDER (ADD) WITHOUT HYPERACTIVITY: ICD-10-CM

## 2023-04-03 NOTE — TELEPHONE ENCOUNTER
LOV             2/17/2023   NOV          Return in about 3 months (around 5/17/2023) for Recheck.     Last RF      2/17/23    Sandra Carrasco, [place]/RICKYR

## 2023-04-05 RX ORDER — DEXTROAMPHETAMINE SACCHARATE, AMPHETAMINE ASPARTATE MONOHYDRATE, DEXTROAMPHETAMINE SULFATE AND AMPHETAMINE SULFATE 2.5; 2.5; 2.5; 2.5 MG/1; MG/1; MG/1; MG/1
10 CAPSULE, EXTENDED RELEASE ORAL EVERY MORNING
Qty: 30 CAPSULE | Refills: 0 | Status: SHIPPED | OUTPATIENT
Start: 2023-04-05

## 2023-05-30 DIAGNOSIS — F98.8 ATTENTION DEFICIT DISORDER (ADD) WITHOUT HYPERACTIVITY: ICD-10-CM

## 2023-05-30 RX ORDER — DEXTROAMPHETAMINE SACCHARATE, AMPHETAMINE ASPARTATE MONOHYDRATE, DEXTROAMPHETAMINE SULFATE AND AMPHETAMINE SULFATE 2.5; 2.5; 2.5; 2.5 MG/1; MG/1; MG/1; MG/1
10 CAPSULE, EXTENDED RELEASE ORAL EVERY MORNING
Qty: 30 CAPSULE | Refills: 0 | OUTPATIENT
Start: 2023-05-30

## 2023-05-30 NOTE — TELEPHONE ENCOUNTER
OK FOR HUB AND FD    NO ANSWER/NO VOICEMAIL, PT WILL NEED A F/U APPT FOR (ADD) MEDICATION REFILLS.

## 2023-06-13 ENCOUNTER — OFFICE VISIT (OUTPATIENT)
Dept: FAMILY MEDICINE CLINIC | Facility: CLINIC | Age: 33
End: 2023-06-13
Payer: COMMERCIAL

## 2023-06-13 VITALS
HEART RATE: 87 BPM | WEIGHT: 173.6 LBS | DIASTOLIC BLOOD PRESSURE: 84 MMHG | SYSTOLIC BLOOD PRESSURE: 136 MMHG | OXYGEN SATURATION: 98 % | BODY MASS INDEX: 29.78 KG/M2 | TEMPERATURE: 97.7 F

## 2023-06-13 DIAGNOSIS — N28.9 RENAL INSUFFICIENCY: ICD-10-CM

## 2023-06-13 DIAGNOSIS — F98.8 ATTENTION DEFICIT DISORDER (ADD) WITHOUT HYPERACTIVITY: ICD-10-CM

## 2023-06-13 DIAGNOSIS — L91.8 INFLAMED SKIN TAG: Primary | ICD-10-CM

## 2023-06-13 DIAGNOSIS — I10 PRIMARY HYPERTENSION: ICD-10-CM

## 2023-06-13 DIAGNOSIS — F41.9 ANXIETY: ICD-10-CM

## 2023-06-13 RX ORDER — VENLAFAXINE HYDROCHLORIDE 75 MG/1
75 CAPSULE, EXTENDED RELEASE ORAL DAILY
Qty: 90 CAPSULE | Refills: 1 | Status: SHIPPED | OUTPATIENT
Start: 2023-06-13

## 2023-06-13 RX ORDER — LISINOPRIL 20 MG/1
20 TABLET ORAL DAILY
Qty: 90 TABLET | Refills: 1 | Status: SHIPPED | OUTPATIENT
Start: 2023-06-13

## 2023-06-13 NOTE — PROGRESS NOTES
"Chief Complaint  ADHD, skin tag left axilla, and Med Refill    Subjective        Shira Robbins presents to [facility]  History of Present Illness  Here to get adhd med refills as well as other medications. She states the medication is helping. She has trouble focusing at work if she doesn't take it, tasks take a lot longer to work. There has been a gap in therapy due to not having time to come into office for an appointment. She has been working at least two jobs for the last few months. She has been out of medication for approx 2 months.     She does have left arm pit skin tag that is bothering her and she would like a referral to someone who can remove. Very tender to touch and she has been getting them in other places too but this skin tag is in her bra line and is causing problems.   Objective   Vital Signs:  /84 (BP Location: Left arm, Patient Position: Sitting, Cuff Size: Adult)   Pulse 87   Temp 97.7 °F (36.5 °C) (Temporal)   Wt 78.7 kg (173 lb 9.6 oz)   SpO2 98%   BMI 29.78 kg/m²   Estimated body mass index is 29.78 kg/m² as calculated from the following:    Height as of 2/17/23: 162.6 cm (64.02\").    Weight as of this encounter: 78.7 kg (173 lb 9.6 oz).             Physical Exam  Constitutional:       Appearance: Normal appearance.   HENT:      Head: Normocephalic.   Cardiovascular:      Rate and Rhythm: Normal rate and regular rhythm.   Pulmonary:      Effort: Pulmonary effort is normal.      Breath sounds: Normal breath sounds.   Musculoskeletal:         General: Normal range of motion.   Skin:     General: Skin is warm and dry.      Comments: Left axilla, one small skin tag, one larger skin tag which is reddened and tender to touch   Neurological:      General: No focal deficit present.      Mental Status: She is alert and oriented to person, place, and time.   Psychiatric:         Mood and Affect: Mood normal.      Result Review :                 "   Assessment and Plan   Diagnoses and all orders for this visit:    1. Inflamed skin tag (Primary)  -     Ambulatory Referral to Dermatology    2. Attention deficit disorder (ADD) without hyperactivity  -     Compliance Drug Analysis, Ur - Urine, Clean Catch    3. Primary hypertension    4. Renal insufficiency  -     Comprehensive metabolic panel    Discussed alternating heat and cold compresses on left axilla to help reduce inflammation and pain at the site of skin tag.         Follow Up   Return in about 3 months (around 9/13/2023) for Next scheduled follow up.  Patient was given instructions and counseling regarding her condition or for health maintenance advice. Please see specific information pulled into the AVS if appropriate.

## 2023-06-14 DIAGNOSIS — H10.13 ALLERGIC CONJUNCTIVITIS OF BOTH EYES: ICD-10-CM

## 2023-06-14 LAB
ALBUMIN SERPL-MCNC: 4.3 G/DL (ref 3.5–5.2)
ALBUMIN/GLOB SERPL: 1.1 G/DL
ALP SERPL-CCNC: 67 U/L (ref 39–117)
ALT SERPL-CCNC: 13 U/L (ref 1–33)
AST SERPL-CCNC: 20 U/L (ref 1–32)
BILIRUB SERPL-MCNC: 0.3 MG/DL (ref 0–1.2)
BUN SERPL-MCNC: 21 MG/DL (ref 6–20)
BUN/CREAT SERPL: 12.4 (ref 7–25)
CALCIUM SERPL-MCNC: 10.1 MG/DL (ref 8.6–10.5)
CHLORIDE SERPL-SCNC: 105 MMOL/L (ref 98–107)
CO2 SERPL-SCNC: 24.8 MMOL/L (ref 22–29)
CREAT SERPL-MCNC: 1.69 MG/DL (ref 0.57–1)
EGFRCR SERPLBLD CKD-EPI 2021: 40.5 ML/MIN/1.73
GLOBULIN SER CALC-MCNC: 3.8 GM/DL
GLUCOSE SERPL-MCNC: 78 MG/DL (ref 65–99)
POTASSIUM SERPL-SCNC: 5.8 MMOL/L (ref 3.5–5.2)
PROT SERPL-MCNC: 8.1 G/DL (ref 6–8.5)
SODIUM SERPL-SCNC: 139 MMOL/L (ref 136–145)

## 2023-06-14 RX ORDER — DEXTROAMPHETAMINE SACCHARATE, AMPHETAMINE ASPARTATE MONOHYDRATE, DEXTROAMPHETAMINE SULFATE AND AMPHETAMINE SULFATE 2.5; 2.5; 2.5; 2.5 MG/1; MG/1; MG/1; MG/1
10 CAPSULE, EXTENDED RELEASE ORAL EVERY MORNING
Qty: 30 CAPSULE | Refills: 0 | Status: SHIPPED | OUTPATIENT
Start: 2023-06-14

## 2023-06-14 RX ORDER — OLOPATADINE HYDROCHLORIDE 2 MG/ML
1 SOLUTION/ DROPS OPHTHALMIC DAILY
Qty: 2.5 ML | Refills: 5 | Status: SHIPPED | OUTPATIENT
Start: 2023-06-14

## 2023-06-15 DIAGNOSIS — N28.9 RENAL INSUFFICIENCY: Primary | ICD-10-CM

## 2023-06-16 ENCOUNTER — PRIOR AUTHORIZATION (OUTPATIENT)
Dept: FAMILY MEDICINE CLINIC | Facility: CLINIC | Age: 33
End: 2023-06-16
Payer: COMMERCIAL

## 2023-06-20 LAB — DRUGS UR: NORMAL

## 2023-07-28 ENCOUNTER — OFFICE VISIT (OUTPATIENT)
Dept: FAMILY MEDICINE CLINIC | Facility: CLINIC | Age: 33
End: 2023-07-28
Payer: COMMERCIAL

## 2023-07-28 VITALS
OXYGEN SATURATION: 99 % | WEIGHT: 170.6 LBS | BODY MASS INDEX: 29.12 KG/M2 | SYSTOLIC BLOOD PRESSURE: 118 MMHG | DIASTOLIC BLOOD PRESSURE: 88 MMHG | HEIGHT: 64 IN | TEMPERATURE: 98 F | HEART RATE: 77 BPM

## 2023-07-28 DIAGNOSIS — Z23 IMMUNIZATION DUE: ICD-10-CM

## 2023-07-28 DIAGNOSIS — F98.8 ATTENTION DEFICIT DISORDER (ADD) WITHOUT HYPERACTIVITY: ICD-10-CM

## 2023-07-28 DIAGNOSIS — L20.84 INTRINSIC ECZEMA: ICD-10-CM

## 2023-07-28 DIAGNOSIS — I10 PRIMARY HYPERTENSION: ICD-10-CM

## 2023-07-28 DIAGNOSIS — F41.9 ANXIETY: ICD-10-CM

## 2023-07-28 DIAGNOSIS — Z11.59 ENCOUNTER FOR HEPATITIS C SCREENING TEST FOR LOW RISK PATIENT: ICD-10-CM

## 2023-07-28 DIAGNOSIS — Z30.8 ENCOUNTER FOR OTHER CONTRACEPTIVE MANAGEMENT: Primary | ICD-10-CM

## 2023-07-28 RX ORDER — DEXTROAMPHETAMINE SACCHARATE, AMPHETAMINE ASPARTATE MONOHYDRATE, DEXTROAMPHETAMINE SULFATE AND AMPHETAMINE SULFATE 2.5; 2.5; 2.5; 2.5 MG/1; MG/1; MG/1; MG/1
10 CAPSULE, EXTENDED RELEASE ORAL EVERY MORNING
Qty: 90 CAPSULE | Refills: 0 | Status: SHIPPED | OUTPATIENT
Start: 2023-07-28 | End: 2023-07-31 | Stop reason: SDUPTHER

## 2023-07-28 RX ORDER — MEDROXYPROGESTERONE ACETATE 150 MG/ML
150 INJECTION, SUSPENSION INTRAMUSCULAR ONCE
Status: DISCONTINUED | OUTPATIENT
Start: 2023-07-28 | End: 2023-07-28

## 2023-07-28 RX ORDER — MEDROXYPROGESTERONE ACETATE 150 MG/ML
150 INJECTION, SUSPENSION INTRAMUSCULAR
Status: SHIPPED | OUTPATIENT
Start: 2023-07-28 | End: 2024-10-20

## 2023-07-28 RX ORDER — TRIAMCINOLONE ACETONIDE 1 MG/G
CREAM TOPICAL
Qty: 454 G | Refills: 0 | Status: SHIPPED | OUTPATIENT
Start: 2023-07-28

## 2023-07-28 RX ADMIN — MEDROXYPROGESTERONE ACETATE 150 MG: 150 INJECTION, SUSPENSION INTRAMUSCULAR at 10:09
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
## 2023-07-29 LAB
ALBUMIN SERPL-MCNC: 4.4 G/DL (ref 3.9–4.9)
ALBUMIN/GLOB SERPL: 1.3 {RATIO} (ref 1.2–2.2)
ALP SERPL-CCNC: 65 IU/L (ref 44–121)
ALT SERPL-CCNC: 16 IU/L (ref 0–32)
AST SERPL-CCNC: 19 IU/L (ref 0–40)
BILIRUB SERPL-MCNC: 0.3 MG/DL (ref 0–1.2)
BUN SERPL-MCNC: 20 MG/DL (ref 6–20)
BUN/CREAT SERPL: 13 (ref 9–23)
CALCIUM SERPL-MCNC: 9.8 MG/DL (ref 8.7–10.2)
CHLORIDE SERPL-SCNC: 108 MMOL/L (ref 96–106)
CHOLEST SERPL-MCNC: 179 MG/DL (ref 100–199)
CO2 SERPL-SCNC: 17 MMOL/L (ref 20–29)
CREAT SERPL-MCNC: 1.6 MG/DL (ref 0.57–1)
EGFRCR SERPLBLD CKD-EPI 2021: 43 ML/MIN/1.73
GLOBULIN SER CALC-MCNC: 3.5 G/DL (ref 1.5–4.5)
GLUCOSE SERPL-MCNC: 80 MG/DL (ref 70–99)
HCV IGG SERPL QL IA: NON REACTIVE
HDLC SERPL-MCNC: 59 MG/DL
LDLC SERPL CALC-MCNC: 105 MG/DL (ref 0–99)
POTASSIUM SERPL-SCNC: 4.8 MMOL/L (ref 3.5–5.2)
PROT SERPL-MCNC: 7.9 G/DL (ref 6–8.5)
SODIUM SERPL-SCNC: 140 MMOL/L (ref 134–144)
TRIGL SERPL-MCNC: 79 MG/DL (ref 0–149)
VLDLC SERPL CALC-MCNC: 15 MG/DL (ref 5–40)

## 2023-07-31 ENCOUNTER — TELEPHONE (OUTPATIENT)
Dept: FAMILY MEDICINE CLINIC | Facility: CLINIC | Age: 33
End: 2023-07-31
Payer: COMMERCIAL

## 2023-07-31 DIAGNOSIS — I10 PRIMARY HYPERTENSION: ICD-10-CM

## 2023-07-31 DIAGNOSIS — F98.8 ATTENTION DEFICIT DISORDER (ADD) WITHOUT HYPERACTIVITY: ICD-10-CM

## 2023-07-31 RX ORDER — DEXTROAMPHETAMINE SACCHARATE, AMPHETAMINE ASPARTATE MONOHYDRATE, DEXTROAMPHETAMINE SULFATE AND AMPHETAMINE SULFATE 2.5; 2.5; 2.5; 2.5 MG/1; MG/1; MG/1; MG/1
10 CAPSULE, EXTENDED RELEASE ORAL EVERY MORNING
Qty: 90 CAPSULE | Refills: 0 | Status: SHIPPED | OUTPATIENT
Start: 2023-07-31 | End: 2023-07-31 | Stop reason: SDUPTHER

## 2023-07-31 RX ORDER — DEXTROAMPHETAMINE SACCHARATE, AMPHETAMINE ASPARTATE MONOHYDRATE, DEXTROAMPHETAMINE SULFATE AND AMPHETAMINE SULFATE 2.5; 2.5; 2.5; 2.5 MG/1; MG/1; MG/1; MG/1
10 CAPSULE, EXTENDED RELEASE ORAL EVERY MORNING
Qty: 90 CAPSULE | Refills: 0 | Status: SHIPPED | OUTPATIENT
Start: 2023-07-31

## 2023-07-31 RX ORDER — LISINOPRIL 20 MG/1
10 TABLET ORAL DAILY
Qty: 90 TABLET | Refills: 1
Start: 2023-07-31

## 2023-08-30 DIAGNOSIS — F98.8 ATTENTION DEFICIT DISORDER (ADD) WITHOUT HYPERACTIVITY: ICD-10-CM

## 2023-08-30 RX ORDER — DEXTROAMPHETAMINE SACCHARATE, AMPHETAMINE ASPARTATE MONOHYDRATE, DEXTROAMPHETAMINE SULFATE AND AMPHETAMINE SULFATE 2.5; 2.5; 2.5; 2.5 MG/1; MG/1; MG/1; MG/1
10 CAPSULE, EXTENDED RELEASE ORAL EVERY MORNING
Qty: 90 CAPSULE | Refills: 0 | Status: CANCELLED | OUTPATIENT
Start: 2023-08-30

## 2023-08-30 NOTE — TELEPHONE ENCOUNTER
Patient states that the pharmacy only gave her a 30 day she needs a refill informed patient to call pharmacy and see if any medication is stored on her file. It shows we sent this over for a 90 day on 07/31/2023

## 2023-08-30 NOTE — TELEPHONE ENCOUNTER
Rx Refill Note  Requested Prescriptions      No prescriptions requested or ordered in this encounter      Last office visit with prescribing clinician: 7/28/2023   Last telemedicine visit with prescribing clinician: Visit date not found   Next office visit with prescribing clinician: 11/8/2023                         Would you like a call back once the refill request has been completed: [] Yes [] No    If the office needs to give you a call back, can they leave a voicemail: [] Yes [] No    Becca Gibson MA  08/30/23, 15:24 EDT

## 2023-09-01 RX ORDER — DEXTROAMPHETAMINE SACCHARATE, AMPHETAMINE ASPARTATE MONOHYDRATE, DEXTROAMPHETAMINE SULFATE AND AMPHETAMINE SULFATE 2.5; 2.5; 2.5; 2.5 MG/1; MG/1; MG/1; MG/1
10 CAPSULE, EXTENDED RELEASE ORAL EVERY MORNING
Qty: 90 CAPSULE | Refills: 0 | Status: SHIPPED | OUTPATIENT
Start: 2023-09-01

## 2023-09-05 ENCOUNTER — TELEPHONE (OUTPATIENT)
Dept: FAMILY MEDICINE CLINIC | Facility: CLINIC | Age: 33
End: 2023-09-05
Payer: COMMERCIAL

## 2023-09-05 NOTE — TELEPHONE ENCOUNTER
Patient called to inform that she is still waiting to get her adderall prescription. It is on hold at the [facility] [address] [place] [address] - [phone]  - [phone] FX [[id number]]

## 2023-09-06 DIAGNOSIS — F98.8 ATTENTION DEFICIT DISORDER (ADD) WITHOUT HYPERACTIVITY: ICD-10-CM

## 2023-09-06 RX ORDER — DEXTROAMPHETAMINE SACCHARATE, AMPHETAMINE ASPARTATE MONOHYDRATE, DEXTROAMPHETAMINE SULFATE AND AMPHETAMINE SULFATE 2.5; 2.5; 2.5; 2.5 MG/1; MG/1; MG/1; MG/1
10 CAPSULE, EXTENDED RELEASE ORAL EVERY MORNING
Qty: 90 CAPSULE | Refills: 0 | Status: SHIPPED | OUTPATIENT
Start: 2023-09-06

## 2023-09-06 NOTE — TELEPHONE ENCOUNTER
Patient called and states that her Adderall was sent to [facility].  [facility] is telling her that they can't fill a 90 day supply since they are a retail pharmacy and they cancelled the prescription.  She is needing it pulled and sent to [facility] please.  Her phone number is [phone].

## 2023-09-06 NOTE — TELEPHONE ENCOUNTER
Patient called and states that her Adderall was sent to [facility]. [facility] is telling her that they can't fill a 90 day supply since they are a retail pharmacy and they cancelled the prescription. She is needing it pulled and sent to [facility] please. Her phone number is [phone].

## 2023-10-15 DIAGNOSIS — G62.9 NEUROPATHY: ICD-10-CM

## 2023-10-16 RX ORDER — TRAMADOL HYDROCHLORIDE 50 MG/1
50 TABLET ORAL DAILY PRN
Qty: 30 TABLET | Refills: 0 | Status: SHIPPED | OUTPATIENT
Start: 2023-10-16

## 2023-11-06 ENCOUNTER — TELEPHONE (OUTPATIENT)
Dept: FAMILY MEDICINE CLINIC | Facility: CLINIC | Age: 33
End: 2023-11-06
Payer: COMMERCIAL

## 2023-11-06 DIAGNOSIS — G62.9 NEUROPATHY: ICD-10-CM

## 2023-11-06 RX ORDER — TRAMADOL HYDROCHLORIDE 50 MG/1
50 TABLET ORAL DAILY PRN
Qty: 30 TABLET | Refills: 0 | Status: SHIPPED | OUTPATIENT
Start: 2023-11-06 | End: 2023-11-08 | Stop reason: SDUPTHER

## 2023-11-06 NOTE — TELEPHONE ENCOUNTER
Patient called and asked if she could get a partial fill of her tramidol until her appointment Wednesday

## 2023-11-08 ENCOUNTER — OFFICE VISIT (OUTPATIENT)
Dept: FAMILY MEDICINE CLINIC | Facility: CLINIC | Age: 33
End: 2023-11-08
Payer: COMMERCIAL

## 2023-11-08 VITALS
HEART RATE: 90 BPM | BODY MASS INDEX: 29.47 KG/M2 | DIASTOLIC BLOOD PRESSURE: 82 MMHG | HEIGHT: 64 IN | SYSTOLIC BLOOD PRESSURE: 130 MMHG | WEIGHT: 172.6 LBS | OXYGEN SATURATION: 97 % | TEMPERATURE: 97.5 F

## 2023-11-08 DIAGNOSIS — I10 PRIMARY HYPERTENSION: Primary | ICD-10-CM

## 2023-11-08 DIAGNOSIS — Z30.8 ENCOUNTER FOR OTHER CONTRACEPTIVE MANAGEMENT: ICD-10-CM

## 2023-11-08 DIAGNOSIS — R79.89 ELEVATED SERUM CREATININE: ICD-10-CM

## 2023-11-08 DIAGNOSIS — H10.13 ALLERGIC CONJUNCTIVITIS OF BOTH EYES: ICD-10-CM

## 2023-11-08 DIAGNOSIS — G62.9 NEUROPATHY: ICD-10-CM

## 2023-11-08 DIAGNOSIS — Z30.42 ENCOUNTER FOR SURVEILLANCE OF INJECTABLE CONTRACEPTIVE: ICD-10-CM

## 2023-11-08 DIAGNOSIS — Z23 IMMUNIZATION DUE: ICD-10-CM

## 2023-11-08 DIAGNOSIS — F41.9 ANXIETY: ICD-10-CM

## 2023-11-08 DIAGNOSIS — F98.8 ATTENTION DEFICIT DISORDER (ADD) WITHOUT HYPERACTIVITY: ICD-10-CM

## 2023-11-08 DIAGNOSIS — L20.84 INTRINSIC ECZEMA: ICD-10-CM

## 2023-11-08 RX ORDER — CYCLOBENZAPRINE HCL 10 MG
10 TABLET ORAL 3 TIMES DAILY PRN
Qty: 90 TABLET | Refills: 1 | Status: SHIPPED | OUTPATIENT
Start: 2023-11-08

## 2023-11-08 RX ORDER — TRAMADOL HYDROCHLORIDE 50 MG/1
50 TABLET ORAL DAILY PRN
Qty: 30 TABLET | Refills: 0 | Status: SHIPPED | OUTPATIENT
Start: 2023-11-08 | End: 2023-11-13 | Stop reason: SDUPTHER

## 2023-11-08 RX ORDER — CHLORCYCLIZINE HYDROCHLORIDE AND PSEUDOEPHEDRINE HYDROCHLORIDE 25; 60 MG/1; MG/1
1 TABLET ORAL DAILY PRN
Qty: 90 TABLET | Refills: 1 | Status: CANCELLED | OUTPATIENT
Start: 2023-11-08

## 2023-11-08 RX ORDER — CLOBETASOL PROPIONATE 0.46 MG/ML
SOLUTION TOPICAL
Qty: 50 ML | Refills: 1 | Status: SHIPPED | OUTPATIENT
Start: 2023-11-08

## 2023-11-08 RX ORDER — TRIAMCINOLONE ACETONIDE 1 MG/G
CREAM TOPICAL
Qty: 454 G | Refills: 0 | Status: SHIPPED | OUTPATIENT
Start: 2023-11-08

## 2023-11-08 RX ORDER — CETIRIZINE HYDROCHLORIDE 10 MG/1
10 TABLET ORAL DAILY
Qty: 90 TABLET | Refills: 3 | Status: SHIPPED | OUTPATIENT
Start: 2023-11-08

## 2023-11-08 RX ORDER — VENLAFAXINE HYDROCHLORIDE 75 MG/1
75 CAPSULE, EXTENDED RELEASE ORAL DAILY
Qty: 90 CAPSULE | Refills: 1 | Status: SHIPPED | OUTPATIENT
Start: 2023-11-08

## 2023-11-08 RX ORDER — OLOPATADINE HYDROCHLORIDE 2 MG/ML
1 SOLUTION/ DROPS OPHTHALMIC DAILY
Qty: 2.5 ML | Refills: 5 | Status: SHIPPED | OUTPATIENT
Start: 2023-11-08

## 2023-11-08 RX ORDER — MEDROXYPROGESTERONE ACETATE 150 MG/ML
150 INJECTION, SUSPENSION INTRAMUSCULAR
Qty: 1 ML | Refills: 3 | Status: SHIPPED | OUTPATIENT
Start: 2023-11-08

## 2023-11-08 RX ORDER — DEXTROAMPHETAMINE SACCHARATE, AMPHETAMINE ASPARTATE MONOHYDRATE, DEXTROAMPHETAMINE SULFATE AND AMPHETAMINE SULFATE 2.5; 2.5; 2.5; 2.5 MG/1; MG/1; MG/1; MG/1
10 CAPSULE, EXTENDED RELEASE ORAL EVERY MORNING
Qty: 90 CAPSULE | Refills: 0 | Status: SHIPPED | OUTPATIENT
Start: 2023-11-08

## 2023-11-08 RX ADMIN — MEDROXYPROGESTERONE ACETATE 150 MG: 150 INJECTION, SUSPENSION INTRAMUSCULAR at 09:22
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
## 2023-11-09 LAB
ALBUMIN SERPL-MCNC: 4.4 G/DL (ref 3.5–5.2)
ALBUMIN/GLOB SERPL: 1.3 G/DL
ALP SERPL-CCNC: 63 U/L (ref 39–117)
ALT SERPL-CCNC: 13 U/L (ref 1–33)
AST SERPL-CCNC: 18 U/L (ref 1–32)
BILIRUB SERPL-MCNC: 0.3 MG/DL (ref 0–1.2)
BUN SERPL-MCNC: 17 MG/DL (ref 6–20)
BUN/CREAT SERPL: 9.5 (ref 7–25)
CALCIUM SERPL-MCNC: 9.7 MG/DL (ref 8.6–10.5)
CHLORIDE SERPL-SCNC: 111 MMOL/L (ref 98–107)
CO2 SERPL-SCNC: 23.1 MMOL/L (ref 22–29)
CREAT SERPL-MCNC: 1.79 MG/DL (ref 0.57–1)
EGFRCR SERPLBLD CKD-EPI 2021: 37.8 ML/MIN/1.73
GLOBULIN SER CALC-MCNC: 3.4 GM/DL
GLUCOSE SERPL-MCNC: 99 MG/DL (ref 65–99)
POTASSIUM SERPL-SCNC: 4.9 MMOL/L (ref 3.5–5.2)
PROT SERPL-MCNC: 7.8 G/DL (ref 6–8.5)
SODIUM SERPL-SCNC: 143 MMOL/L (ref 136–145)

## 2023-11-10 DIAGNOSIS — R79.89 HIGH SERUM CREATININE: Primary | ICD-10-CM

## 2023-11-13 ENCOUNTER — TELEPHONE (OUTPATIENT)
Dept: FAMILY MEDICINE CLINIC | Facility: CLINIC | Age: 33
End: 2023-11-13
Payer: COMMERCIAL

## 2023-11-13 DIAGNOSIS — G62.9 NEUROPATHY: ICD-10-CM

## 2023-11-13 RX ORDER — TRAMADOL HYDROCHLORIDE 50 MG/1
50 TABLET ORAL DAILY PRN
Qty: 30 TABLET | Refills: 0 | Status: SHIPPED | OUTPATIENT
Start: 2023-11-13

## 2023-11-13 NOTE — TELEPHONE ENCOUNTER
Ok to relay    Attempted to call the patient o inform of lab results:    Your kidney function is getting much worse.  Please stop all anti-inflammatories.  We will get you into a kidney doctor to discuss next steps.

## 2023-11-13 NOTE — TELEPHONE ENCOUNTER
Patient wanted to know if you can send the Tramadol to this pharmacy [facility] [place] [id number]  on [facility] [phone].

## 2023-11-13 NOTE — TELEPHONE ENCOUNTER
Patient is returning call, not sure who called, only said that could be reached at number on file,

## 2024-01-18 DIAGNOSIS — G62.9 NEUROPATHY: ICD-10-CM

## 2024-01-18 DIAGNOSIS — F98.8 ATTENTION DEFICIT DISORDER (ADD) WITHOUT HYPERACTIVITY: ICD-10-CM

## 2024-01-19 RX ORDER — DEXTROAMPHETAMINE SACCHARATE, AMPHETAMINE ASPARTATE MONOHYDRATE, DEXTROAMPHETAMINE SULFATE AND AMPHETAMINE SULFATE 2.5; 2.5; 2.5; 2.5 MG/1; MG/1; MG/1; MG/1
10 CAPSULE, EXTENDED RELEASE ORAL EVERY MORNING
Qty: 90 CAPSULE | Refills: 0 | Status: SHIPPED | OUTPATIENT
Start: 2024-01-19

## 2024-01-19 RX ORDER — TRAMADOL HYDROCHLORIDE 50 MG/1
50 TABLET ORAL DAILY PRN
Qty: 30 TABLET | Refills: 0 | Status: SHIPPED | OUTPATIENT
Start: 2024-01-19

## 2024-01-29 DIAGNOSIS — G62.9 NEUROPATHY: ICD-10-CM

## 2024-01-29 RX ORDER — TRAMADOL HYDROCHLORIDE 50 MG/1
50 TABLET ORAL DAILY PRN
Qty: 30 TABLET | Refills: 0 | Status: SHIPPED | OUTPATIENT
Start: 2024-01-29

## 2024-02-07 ENCOUNTER — OFFICE VISIT (OUTPATIENT)
Dept: FAMILY MEDICINE CLINIC | Facility: CLINIC | Age: 34
End: 2024-02-07
Payer: COMMERCIAL

## 2024-02-07 VITALS
SYSTOLIC BLOOD PRESSURE: 126 MMHG | BODY MASS INDEX: 29.71 KG/M2 | HEIGHT: 64 IN | HEART RATE: 82 BPM | WEIGHT: 174 LBS | OXYGEN SATURATION: 99 % | DIASTOLIC BLOOD PRESSURE: 80 MMHG

## 2024-02-07 DIAGNOSIS — I10 PRIMARY HYPERTENSION: ICD-10-CM

## 2024-02-07 DIAGNOSIS — G62.9 NEUROPATHY: ICD-10-CM

## 2024-02-07 DIAGNOSIS — F41.9 ANXIETY: Primary | ICD-10-CM

## 2024-02-07 DIAGNOSIS — F98.8 ATTENTION DEFICIT DISORDER (ADD) WITHOUT HYPERACTIVITY: ICD-10-CM

## 2024-02-07 PROCEDURE — 99214 OFFICE O/P EST MOD 30 MIN: CPT | Performed by: FAMILY MEDICINE

## 2024-02-07 RX ORDER — FLUOXETINE 10 MG/1
10 CAPSULE ORAL DAILY
Qty: 90 CAPSULE | Refills: 3 | Status: SHIPPED | OUTPATIENT
Start: 2024-02-07

## 2024-02-07 RX ORDER — DEXTROAMPHETAMINE SACCHARATE, AMPHETAMINE ASPARTATE MONOHYDRATE, DEXTROAMPHETAMINE SULFATE AND AMPHETAMINE SULFATE 2.5; 2.5; 2.5; 2.5 MG/1; MG/1; MG/1; MG/1
10 CAPSULE, EXTENDED RELEASE ORAL EVERY MORNING
Qty: 90 CAPSULE | Refills: 0 | Status: CANCELLED | OUTPATIENT
Start: 2024-02-07

## 2024-02-07 RX ORDER — DEXTROAMPHETAMINE SACCHARATE, AMPHETAMINE ASPARTATE MONOHYDRATE, DEXTROAMPHETAMINE SULFATE AND AMPHETAMINE SULFATE 5; 5; 5; 5 MG/1; MG/1; MG/1; MG/1
20 CAPSULE, EXTENDED RELEASE ORAL EVERY MORNING
Qty: 90 CAPSULE | Refills: 0 | Status: SHIPPED | OUTPATIENT
Start: 2024-02-07
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
## 2024-02-08 LAB
ALBUMIN SERPL-MCNC: 4.1 G/DL (ref 3.5–5.2)
ALBUMIN/GLOB SERPL: 1.1 G/DL
ALP SERPL-CCNC: 66 U/L (ref 39–117)
ALT SERPL-CCNC: 15 U/L (ref 1–33)
AST SERPL-CCNC: 17 U/L (ref 1–32)
BILIRUB SERPL-MCNC: 0.3 MG/DL (ref 0–1.2)
BUN SERPL-MCNC: 17 MG/DL (ref 6–20)
BUN/CREAT SERPL: 9.9 (ref 7–25)
CALCIUM SERPL-MCNC: 9.5 MG/DL (ref 8.6–10.5)
CHLORIDE SERPL-SCNC: 104 MMOL/L (ref 98–107)
CO2 SERPL-SCNC: 23.9 MMOL/L (ref 22–29)
CREAT SERPL-MCNC: 1.72 MG/DL (ref 0.57–1)
EGFRCR SERPLBLD CKD-EPI 2021: 39.4 ML/MIN/1.73
GLOBULIN SER CALC-MCNC: 3.7 GM/DL
GLUCOSE SERPL-MCNC: 86 MG/DL (ref 65–99)
POTASSIUM SERPL-SCNC: 4.8 MMOL/L (ref 3.5–5.2)
PROT SERPL-MCNC: 7.8 G/DL (ref 6–8.5)
SODIUM SERPL-SCNC: 142 MMOL/L (ref 136–145)

## 2024-03-11 DIAGNOSIS — G62.9 NEUROPATHY: ICD-10-CM

## 2024-03-11 DIAGNOSIS — F98.8 ATTENTION DEFICIT DISORDER (ADD) WITHOUT HYPERACTIVITY: ICD-10-CM

## 2024-03-12 RX ORDER — CHLORCYCLIZINE HYDROCHLORIDE AND PSEUDOEPHEDRINE HYDROCHLORIDE 25; 60 MG/1; MG/1
1 TABLET ORAL DAILY PRN
Qty: 90 TABLET | Refills: 0 | Status: SHIPPED | OUTPATIENT
Start: 2024-03-12

## 2024-03-13 RX ORDER — DEXTROAMPHETAMINE SACCHARATE, AMPHETAMINE ASPARTATE MONOHYDRATE, DEXTROAMPHETAMINE SULFATE AND AMPHETAMINE SULFATE 5; 5; 5; 5 MG/1; MG/1; MG/1; MG/1
20 CAPSULE, EXTENDED RELEASE ORAL EVERY MORNING
Qty: 90 CAPSULE | Refills: 0 | Status: SHIPPED | OUTPATIENT
Start: 2024-03-13

## 2024-03-13 RX ORDER — TRAMADOL HYDROCHLORIDE 50 MG/1
50 TABLET ORAL DAILY PRN
Qty: 30 TABLET | Refills: 0 | Status: SHIPPED | OUTPATIENT
Start: 2024-03-13

## 2024-05-13 DIAGNOSIS — F98.8 ATTENTION DEFICIT DISORDER (ADD) WITHOUT HYPERACTIVITY: ICD-10-CM

## 2024-05-13 DIAGNOSIS — G62.9 NEUROPATHY: ICD-10-CM

## 2024-05-13 RX ORDER — TRAMADOL HYDROCHLORIDE 50 MG/1
50 TABLET ORAL DAILY PRN
Qty: 30 TABLET | Refills: 0 | Status: SHIPPED | OUTPATIENT
Start: 2024-05-13

## 2024-05-13 RX ORDER — DEXTROAMPHETAMINE SACCHARATE, AMPHETAMINE ASPARTATE MONOHYDRATE, DEXTROAMPHETAMINE SULFATE AND AMPHETAMINE SULFATE 5; 5; 5; 5 MG/1; MG/1; MG/1; MG/1
20 CAPSULE, EXTENDED RELEASE ORAL EVERY MORNING
Qty: 90 CAPSULE | Refills: 0 | OUTPATIENT
Start: 2024-05-13

## 2024-05-31 DIAGNOSIS — F98.8 ATTENTION DEFICIT DISORDER (ADD) WITHOUT HYPERACTIVITY: ICD-10-CM

## 2024-05-31 RX ORDER — CHLORCYCLIZINE HYDROCHLORIDE AND PSEUDOEPHEDRINE HYDROCHLORIDE 25; 60 MG/1; MG/1
1 TABLET ORAL DAILY PRN
Qty: 90 TABLET | Refills: 0 | Status: SHIPPED | OUTPATIENT
Start: 2024-05-31

## 2024-05-31 RX ORDER — DEXTROAMPHETAMINE SACCHARATE, AMPHETAMINE ASPARTATE MONOHYDRATE, DEXTROAMPHETAMINE SULFATE AND AMPHETAMINE SULFATE 5; 5; 5; 5 MG/1; MG/1; MG/1; MG/1
20 CAPSULE, EXTENDED RELEASE ORAL EVERY MORNING
Qty: 90 CAPSULE | Refills: 0 | Status: SHIPPED | OUTPATIENT
Start: 2024-05-31

## 2024-05-31 NOTE — TELEPHONE ENCOUNTER
Patient is requesting a refill of her Adderall and Stahist (90 day). She has an appointment scheduled for 6/24 and there is not a sooner appointment to move her to. Patient would likethes to be sent to [place] [id number] [place] [address] - [phone]  - [phone] FX [[id number]]

## 2024-06-24 ENCOUNTER — OFFICE VISIT (OUTPATIENT)
Dept: FAMILY MEDICINE CLINIC | Facility: CLINIC | Age: 34
End: 2024-06-24
Payer: COMMERCIAL

## 2024-06-24 VITALS
DIASTOLIC BLOOD PRESSURE: 76 MMHG | BODY MASS INDEX: 28.92 KG/M2 | HEART RATE: 81 BPM | OXYGEN SATURATION: 100 % | HEIGHT: 64 IN | SYSTOLIC BLOOD PRESSURE: 122 MMHG | WEIGHT: 169.4 LBS | TEMPERATURE: 97.5 F

## 2024-06-24 DIAGNOSIS — G62.9 NEUROPATHY: ICD-10-CM

## 2024-06-24 DIAGNOSIS — F41.9 ANXIETY: ICD-10-CM

## 2024-06-24 DIAGNOSIS — L20.84 INTRINSIC ECZEMA: ICD-10-CM

## 2024-06-24 DIAGNOSIS — R79.89 HIGH SERUM CREATININE: ICD-10-CM

## 2024-06-24 DIAGNOSIS — I10 PRIMARY HYPERTENSION: Primary | ICD-10-CM

## 2024-06-24 DIAGNOSIS — F98.8 ATTENTION DEFICIT DISORDER (ADD) WITHOUT HYPERACTIVITY: ICD-10-CM

## 2024-06-24 PROCEDURE — 3078F DIAST BP <80 MM HG: CPT | Performed by: FAMILY MEDICINE

## 2024-06-24 PROCEDURE — 1159F MED LIST DOCD IN RCRD: CPT | Performed by: FAMILY MEDICINE

## 2024-06-24 PROCEDURE — 99214 OFFICE O/P EST MOD 30 MIN: CPT | Performed by: FAMILY MEDICINE

## 2024-06-24 PROCEDURE — 3074F SYST BP LT 130 MM HG: CPT | Performed by: FAMILY MEDICINE

## 2024-06-24 PROCEDURE — 1126F AMNT PAIN NOTED NONE PRSNT: CPT | Performed by: FAMILY MEDICINE

## 2024-06-24 PROCEDURE — 1160F RVW MEDS BY RX/DR IN RCRD: CPT | Performed by: FAMILY MEDICINE

## 2024-06-24 RX ORDER — TRIAMCINOLONE ACETONIDE 1 MG/G
CREAM TOPICAL
Qty: 454 G | Refills: 0 | Status: SHIPPED | OUTPATIENT
Start: 2024-06-24

## 2024-06-24 RX ORDER — DEXTROAMPHETAMINE SACCHARATE, AMPHETAMINE ASPARTATE MONOHYDRATE, DEXTROAMPHETAMINE SULFATE AND AMPHETAMINE SULFATE 5; 5; 5; 5 MG/1; MG/1; MG/1; MG/1
20 CAPSULE, EXTENDED RELEASE ORAL EVERY MORNING
Qty: 90 CAPSULE | Refills: 0 | Status: SHIPPED | OUTPATIENT
Start: 2024-06-24

## 2024-06-24 RX ORDER — TRAMADOL HYDROCHLORIDE 50 MG/1
50 TABLET ORAL DAILY PRN
Qty: 30 TABLET | Refills: 0 | Status: SHIPPED | OUTPATIENT
Start: 2024-06-24

## 2024-06-24 RX ORDER — CHLORCYCLIZINE HYDROCHLORIDE AND PSEUDOEPHEDRINE HYDROCHLORIDE 25; 60 MG/1; MG/1
1 TABLET ORAL DAILY PRN
Qty: 90 TABLET | Refills: 0 | Status: SHIPPED | OUTPATIENT
Start: 2024-06-24
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
## 2024-07-10 ENCOUNTER — TELEPHONE (OUTPATIENT)
Dept: FAMILY MEDICINE CLINIC | Facility: CLINIC | Age: 34
End: 2024-07-10
Payer: COMMERCIAL

## 2024-07-10 DIAGNOSIS — H10.13 ALLERGIC CONJUNCTIVITIS OF BOTH EYES: ICD-10-CM

## 2024-07-10 RX ORDER — OLOPATADINE HYDROCHLORIDE 2 MG/ML
1 SOLUTION/ DROPS OPHTHALMIC DAILY
Qty: 2.5 ML | Refills: 5 | Status: SHIPPED | OUTPATIENT
Start: 2024-07-10

## 2024-07-10 NOTE — TELEPHONE ENCOUNTER
Patient called and wanted to know if you can prescribe Olopatadine pataday she is having a flare up and work excuse, I told the patient she would have to be seen to get the work excuse.

## 2024-07-11 ENCOUNTER — OFFICE VISIT (OUTPATIENT)
Dept: FAMILY MEDICINE CLINIC | Facility: CLINIC | Age: 34
End: 2024-07-11
Payer: COMMERCIAL

## 2024-07-11 VITALS
OXYGEN SATURATION: 99 % | DIASTOLIC BLOOD PRESSURE: 80 MMHG | BODY MASS INDEX: 28.82 KG/M2 | WEIGHT: 168.8 LBS | HEART RATE: 102 BPM | HEIGHT: 64 IN | TEMPERATURE: 98.2 F | SYSTOLIC BLOOD PRESSURE: 130 MMHG

## 2024-07-11 DIAGNOSIS — H10.32 ACUTE BACTERIAL CONJUNCTIVITIS OF LEFT EYE: Primary | ICD-10-CM

## 2024-07-11 PROCEDURE — 3079F DIAST BP 80-89 MM HG: CPT

## 2024-07-11 PROCEDURE — 1160F RVW MEDS BY RX/DR IN RCRD: CPT

## 2024-07-11 PROCEDURE — 1126F AMNT PAIN NOTED NONE PRSNT: CPT

## 2024-07-11 PROCEDURE — 99213 OFFICE O/P EST LOW 20 MIN: CPT

## 2024-07-11 PROCEDURE — 3075F SYST BP GE 130 - 139MM HG: CPT

## 2024-07-11 PROCEDURE — 1159F MED LIST DOCD IN RCRD: CPT

## 2024-07-11 RX ORDER — POLYMYXIN B SULFATE AND TRIMETHOPRIM 1; 10000 MG/ML; [USP'U]/ML
1 SOLUTION OPHTHALMIC EVERY 6 HOURS
Qty: 10 ML | Refills: 0 | Status: SHIPPED | OUTPATIENT
Start: 2024-07-11 | End: 2024-07-21

## 2024-07-11 NOTE — LETTER
July 11, 2024     Patient: Shira Robbins   YOB: 1990   Date of Visit: 7/11/2024       To Whom It May Concern:    It is my medical opinion that Shira Robbins may return to work on 07/15/24. Please excuse 07/10-07/11/24.         Sincerely,        Aria Rachel APRN    CC: No Recipients

## 2024-07-11 NOTE — PROGRESS NOTES
"Chief Complaint  Conjunctivitis    Subjective          Conjunctivitis   Episode onset: 3 days. The onset was sudden. The problem has been unchanged. The problem is moderate. Nothing (pataday, warm compresses with no relief.) relieves the symptoms. Nothing aggravates the symptoms. Associated symptoms include double vision, eye discharge, eye pain and eye redness. Pertinent negatives include no fever, no abdominal pain, no constipation, no diarrhea, no muscle aches, no cough, no wheezing and no rash.   Reports frequent episodes with conjunctivitis.        Objective   Vital Signs:  /80 (BP Location: Left arm, Patient Position: Sitting, Cuff Size: Adult)   Pulse 102   Temp 98.2 °F (36.8 °C)   Ht 162.6 cm (64.02\")   Wt 76.6 kg (168 lb 12.8 oz)   SpO2 99%   BMI 28.96 kg/m²   Estimated body mass index is 28.96 kg/m² as calculated from the following:    Height as of this encounter: 162.6 cm (64.02\").    Weight as of this encounter: 76.6 kg (168 lb 12.8 oz).             Physical Exam  Vitals reviewed.   Constitutional:       General: She is not in acute distress.     Appearance: Normal appearance.   HENT:      Head: Normocephalic and atraumatic.      Mouth/Throat:      Mouth: Mucous membranes are moist.      Pharynx: No oropharyngeal exudate or posterior oropharyngeal erythema.   Eyes:      General:         Left eye: Discharge present.No foreign body or hordeolum.      Conjunctiva/sclera: Conjunctivae normal.      Left eye: Left conjunctiva is injected.      Pupils: Pupils are equal, round, and reactive to light.      Comments: Mild swelling to upper eyelid.   Cardiovascular:      Rate and Rhythm: Normal rate and regular rhythm.      Pulses: Normal pulses.      Heart sounds: No murmur heard.     No gallop.   Pulmonary:      Effort: Pulmonary effort is normal. No respiratory distress.      Breath sounds: Normal breath sounds. No wheezing.   Abdominal:      General: Bowel sounds are normal. There is no distension. "      Palpations: Abdomen is soft.      Tenderness: There is no abdominal tenderness.   Musculoskeletal:         General: Normal range of motion.      Cervical back: Normal range of motion and neck supple. No tenderness.   Skin:     General: Skin is warm and dry.   Neurological:      Mental Status: She is alert and oriented to person, place, and time. Mental status is at baseline.   Psychiatric:         Mood and Affect: Mood normal.        Result Review :                     Assessment and Plan     Diagnoses and all orders for this visit:    1. Acute bacterial conjunctivitis of left eye (Primary)  -     trimethoprim-polymyxin b (Polytrim) 83338-7.1 UNIT/ML-% ophthalmic solution; Administer 1 drop into the left eye Every 6 (Six) Hours for 10 days.  Dispense: 10 mL; Refill: 0    Continue to use good hand hygiene and warm compresses. In right eye becomes red, start eye drops in that eye.         Follow Up     Return if symptoms worsen or fail to improve.  Patient was given instructions and counseling regarding her condition or for health maintenance advice. Please see specific information pulled into the AVS if appropriate.

## 2024-07-12 ENCOUNTER — TELEPHONE (OUTPATIENT)
Dept: FAMILY MEDICINE CLINIC | Facility: CLINIC | Age: 34
End: 2024-07-12
Payer: COMMERCIAL

## 2024-07-12 NOTE — TELEPHONE ENCOUNTER
Medication was sent to pharmacy.  
Patient called and stated her pharmacy hadn't received her rx for trimethoprim-polymyxin b   
neurovascularly intact

## 2024-07-17 ENCOUNTER — TELEPHONE (OUTPATIENT)
Dept: FAMILY MEDICINE CLINIC | Facility: CLINIC | Age: 34
End: 2024-07-17
Payer: COMMERCIAL
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
## 2024-07-26 ENCOUNTER — CLINICAL SUPPORT (OUTPATIENT)
Dept: FAMILY MEDICINE CLINIC | Facility: CLINIC | Age: 34
End: 2024-07-26
Payer: COMMERCIAL

## 2024-07-26 DIAGNOSIS — Z30.42 ENCOUNTER FOR SURVEILLANCE OF INJECTABLE CONTRACEPTIVE: Primary | ICD-10-CM

## 2024-07-26 DIAGNOSIS — H10.13 ALLERGIC CONJUNCTIVITIS OF BOTH EYES: ICD-10-CM

## 2024-07-26 PROCEDURE — 81025 URINE PREGNANCY TEST: CPT | Performed by: FAMILY MEDICINE

## 2024-07-26 PROCEDURE — 96372 THER/PROPH/DIAG INJ SC/IM: CPT | Performed by: FAMILY MEDICINE

## 2024-07-26 RX ORDER — OLOPATADINE HYDROCHLORIDE 2 MG/ML
1 SOLUTION/ DROPS OPHTHALMIC DAILY
Qty: 2.5 ML | Refills: 5 | Status: SHIPPED | OUTPATIENT
Start: 2024-07-26

## 2024-07-26 RX ADMIN — MEDROXYPROGESTERONE ACETATE 150 MG: 150 INJECTION, SUSPENSION INTRAMUSCULAR at 11:13

## 2024-07-26 NOTE — TELEPHONE ENCOUNTER
[address] [place] [address]. [address] 6080 [facility] 
[phone] Patient: Naheed Chen MRN: [id number] ISABEL:[birth year] Visit Information Date & Time Provider Department Dept. Phone Encounter #  
 2/12/2018 11:30 AM Sara Ceballos  [facility] [phone] [id number] Follow-up Instructions Return in about 6 weeks (around 3/26/2018) for routine follow up. Upcoming Health Maintenance Date Due Hepatitis C Screening 1958 DTaP/Tdap/Td series (1 - Tdap) 5/22/1978 FOBT Q 1 YEAR AGE 50-75 5/22/2007 MEDICARE YEARLY EXAM 12/18/2016 ZOSTER VACCINE AGE 60> 3/22/2017 Influenza Age 5 to Adult 8/1/2017 Allergies as of 2/12/2018  Review Complete On: 2/12/2018 By: Sara Ceballos MD  
 No Known Allergies Current Immunizations  Reviewed on 12/18/2015 Name Date Influenza Vaccine (Quad) PF  Incomplete Pneumococcal Conjugate (PCV-13) 12/18/2015 Pneumococcal Polysaccharide (PPSV-23) 10/17/2016 TB Skin Test (PPD) 6/1/2008 Not reviewed this visit You Were Diagnosed With   
  
 Codes Comments Medicare annual wellness visit, subsequent    -  Primary ICD-10-CM: Z00.00 ICD-9-CM: V70.0 Screening for alcoholism     ICD-10-CM: Z13.89 ICD-9-CM: V79.1 Screening for depression     ICD-10-CM: Z13.89 ICD-9-CM: V79.0 Screening for diabetes mellitus     ICD-10-CM: Z13.1 ICD-9-CM: V77.1 Screening for ischemic heart disease     ICD-10-CM: Z13.6 ICD-9-CM: V81.0 Encounter for immunization     ICD-10-CM: K26 ICD-9-CM: V03.89 Tobacco abuse     ICD-10-CM: Z72.0 ICD-9-CM: 305.1 Screening for colon cancer     ICD-10-CM: Z12.11 ICD-9-CM: V76.51 Essential hypertension     ICD-10-CM: I10 
ICD-9-CM: 401.9 Vitals BP Pulse Temp Resp Height(growth percentile) Weight(growth percentile)  130/90 (BP 1 Location: Left arm, BP Patient Position: Sitting) 90 98.4 °F Just filled on 7/10/24 with refills     -- send to different pharmacy    [facility] Duarte/RICKYR         (36.9 °C) (Oral) 16 5' 6\" (1.676 m) 118 lb (53.5 kg) SpO2 BMI Smoking Status 98% 19.05 kg/m2 Light Tobacco Smoker Vitals History BMI and BSA Data Body Mass Index Body Surface Area 19.05 kg/m 2 1.58 m 2 Preferred Pharmacy Pharmacy Name Phone [facility], [address] [facility] 15[facility] [phone] Your Updated Medication List  
  
   
This list is accurate as of: 2/12/18 12:38 PM.  Always use your most recent med list.  
  
  
  
  
 aspirin 81 mg chewable tablet Take 1 Tab by mouth daily. buPROPion 75 mg tablet Commonly known as:  [facility] Take 1 Tab by mouth three (3) times daily. Start 1 tablet daily, usually in the evening, every few days increase as tolerated, quit.  
  
 hydroCHLOROthiazide 25 mg tablet Commonly known as:  HYDRODIURIL Take 1 Tab by mouth daily. naproxen 500 mg tablet Commonly known as:  NAPROSYN Take 1 Tab by mouth two (2) times daily (with meals). As needed Prescriptions Sent to Pharmacy Refills buPROPion (WELLBUTRIN) 75 mg tablet 5 Sig: Take 1 Tab by mouth three (3) times daily. Start 1 tablet daily, usually in the evening, every few days increase as tolerated, quit. Class: Normal  
 Pharmacy: [address] [place] [address] Ph #: [phone] Route: Oral  
 hydroCHLOROthiazide (HYDRODIURIL) 25 mg tablet 3 Sig: Take 1 Tab by mouth daily. Class: Normal  
 Pharmacy: [address] [place] [address] Ph #: [phone] Route: Oral  
  
We Performed the Following [facility] [[id number] [facility]] INFLUENZA VIRUS VAC QUAD,SPLIT,PRESV FREE SYRINGE IM [id number] CPT(R)] OCCULT BLOOD, IMMUNOASSAY (FIT) [id number] CPT(R)] HI ANNUAL ALCOHOL SCREEN 15 MIN [id number] [facility]] Follow-up Instructions Return in about 6 weeks (around 3/26/2018) for routine follow up. Patient Instructions Medicare Wellness Visit, Male The best way to live healthy is to have a healthy lifestyle by eating a well-balanced diet, exercising regularly, limiting alcohol and stopping smoking. Regular physical exams and screening tests are another way to keep healthy. Preventive exams provided by your health care provider can find health problems before they become diseases or illnesses. Preventive services including immunizations, screening tests, monitoring and exams can help you take care of your own health. All people over age 72 should have a pneumovax  and and a prevnar shot to prevent pneumonia. These are once in a lifetime unless you and your provider decide differently. All people over 65 should have a yearly flu shot and a tetanus vaccine every 10 years. Screening for diabetes mellitus with a blood sugar test should be done every year. Glaucoma is a disease of the eye due to increased ocular pressure that can lead to blindness and it should be done every year by an eye professional. 
 
Cardiovascular screening tests that check for elevated lipids (fatty part of blood) which can lead to heart disease and strokes should be done every 5 years. Colorectal screening that evaluates for blood or polyps in your colon should be done yearly as a stool test or every five years as a flexible sigmoidoscope or every 10 years as a colonoscopy up to age 76. Men up to age 76 may need a screening blood test for prostate cancer at certain intervals, depending on their personal and family history. This decision is between the patient and his provider. If you have been a smoker or had family history of abdominal aortic aneurysms, you and your provider may decide to schedule an ultrasound test of your aorta. Hepatitis C screening is also recommended for anyone born between [birth year] through [address]. A shingles vaccine is also recommended once in a lifetime after age 61. Your Medicare Wellness Exam is recommended annually. Here is a list of your current Health Maintenance items with a due date: 
Health Maintenance Due Topic Date Due  
 Hepatitis C Test  1958  
 DTaP/Tdap/Td  (1 - Tdap) 05/22/1978  Stool testing for trace blood  05/22/2007 [facility] Annual Well Visit  12/18/2016  Shingles Vaccine  03/22/2017  Flu Vaccine  08/01/2017 Deciding About Using Medicines To Quit Smoking Deciding About Using Medicines To Quit Smoking What are the medicines you can use? Your doctor may prescribe varenicline (Chantix) or bupropion (Zyban). These medicines can help you cope with cravings for tobacco. They are pills that don't contain nicotine. You also can use nicotine replacement products. These do contain nicotine. There are many types. · Gum and lozenges slowly release nicotine into your mouth. · Patches stick to your skin. They slowly release nicotine into your bloodstream. 
· An inhaler has a briggs that contains nicotine. You breathe in a puff of nicotine vapor through your mouth and throat. · Nasal spray releases a mist that contains nicotine. What are key points about this decision? · Using medicines can double your chances of quitting smoking. They can ease cravings and withdrawal symptoms. · Getting counseling along with using medicine can raise your chances of quitting even more. · If you smoke fewer than 5 cigarettes a day, you may not need medicines to help you quit smoking. · These medicines have less nicotine than cigarettes. And by itself, nicotine is not nearly as harmful as smoking. The tars, carbon monoxide, and other toxic chemicals in tobacco cause the harmful effects. · The side effects of nicotine replacement products depend on the type of product. For example, a patch can make your skin red and itchy. Medicines in pill form can make you sick to your stomach.  They can also cause dry mouth and trouble sleeping. For most people, the side effects are not bad enough to make them stop using the products. Why might you choose to use medicines to quit smoking? · You have tried on your own to stop smoking, but you were not able to stop. · You smoke more than 5 cigarettes a day. · You want to increase your chances of quitting smoking. · You want to reduce your cravings and withdrawal symptoms. · You feel the benefits of medicine outweigh the side effects. Why might you choose not to use medicine? · You want to try quitting on your own by stopping all at once (\"cold turkey\"). · You want to cut back slowly on the number of cigarettes you smoke. · You smoke fewer than 5 cigarettes a day. · You do not like using medicine. · You feel the side effects of medicines outweigh the benefits. · You are worried about the cost of medicines. Your decision Thinking about the facts and your feelings can help you make a decision that is right for you. Be sure you understand the benefits and risks of your options, and think about what else you need to do before you make the decision. Where can you learn more? Go to http://layne-mary.info/. Enter M318 in the search box to learn more about \"Deciding About Using Medicines To Quit Smoking. \" Current as of: March 20, 2017 Content Version: 11.4 © 1182-3767 Healthwise, Incorporated. Care instructions adapted under license by [employer] (which disclaims liability or warranty for this information). If you have questions about a medical condition or this instruction, always ask your healthcare professional. [address] any warranty or liability for your use of this information. Introducing [facility] & HEALTH SERVICES! [facility] introduces Payteller patient portal. Now you can access parts of your medical record, email your doctor's office, and request medication refills online. 1. In your internet browser, go to [email or web address] 2. Click on the First Time User? Click Here link in the Sign In box. You will see the New Member Sign Up page. 3. Enter your [employer] Access Code exactly as it appears below. You will not need to use this code after youve completed the sign-up process. If you do not sign up before the expiration date, you must request a new code. · [employer] Access Code: [email or web address] Expires: 5/13/2018 11:31 AM 
 
4. Enter the last four digits of your Social Security Number (xxxx) and Date of Birth (mm/dd/yyyy) as indicated and click Submit. You will be taken to the next sign-up page. 5. Create a LiveNinjat ID. This will be your [employer] login ID and cannot be changed, so think of one that is secure and easy to remember. 6. Create a [employer] password. You can change your password at any time. 7. Enter your Password Reset Question and Answer. This can be used at a later time if you forget your password. 8. Enter your e-mail address. You will receive e-mail notification when new information is available in [address] 9. Click Sign Up. You can now view and download portions of your medical record. 10. Click the Download Summary menu link to download a portable copy of your medical information. If you have questions, please visit the Frequently Asked Questions section of the [employer] website. Remember, [employer] is NOT to be used for urgent needs. For medical emergencies, dial 911. Now available from your iPhone and Android! Please provide this summary of care documentation to your next provider. Your primary care clinician is listed as Eliza Yeung. If you have any questions after today's visit, please call [phone].

## 2024-08-23 DIAGNOSIS — G62.9 NEUROPATHY: ICD-10-CM

## 2024-08-23 DIAGNOSIS — F98.8 ATTENTION DEFICIT DISORDER (ADD) WITHOUT HYPERACTIVITY: ICD-10-CM

## 2024-08-24 ENCOUNTER — PATIENT MESSAGE (OUTPATIENT)
Dept: FAMILY MEDICINE CLINIC | Facility: CLINIC | Age: 34
End: 2024-08-24
Payer: COMMERCIAL

## 2024-08-26 RX ORDER — DEXTROAMPHETAMINE SACCHARATE, AMPHETAMINE ASPARTATE MONOHYDRATE, DEXTROAMPHETAMINE SULFATE AND AMPHETAMINE SULFATE 5; 5; 5; 5 MG/1; MG/1; MG/1; MG/1
20 CAPSULE, EXTENDED RELEASE ORAL EVERY MORNING
Qty: 90 CAPSULE | Refills: 0 | Status: SHIPPED | OUTPATIENT
Start: 2024-08-26

## 2024-08-26 RX ORDER — TRAMADOL HYDROCHLORIDE 50 MG/1
50 TABLET ORAL DAILY PRN
Qty: 30 TABLET | Refills: 0 | Status: SHIPPED | OUTPATIENT
Start: 2024-08-26

## 2024-09-12 DIAGNOSIS — F98.8 ATTENTION DEFICIT DISORDER (ADD) WITHOUT HYPERACTIVITY: ICD-10-CM

## 2024-09-12 DIAGNOSIS — L20.84 INTRINSIC ECZEMA: ICD-10-CM

## 2024-09-12 RX ORDER — TRIAMCINOLONE ACETONIDE 1 MG/G
CREAM TOPICAL
Qty: 454 G | Refills: 1 | Status: SHIPPED | OUTPATIENT
Start: 2024-09-12

## 2024-09-12 RX ORDER — CHLORCYCLIZINE HYDROCHLORIDE AND PSEUDOEPHEDRINE HYDROCHLORIDE 25; 60 MG/1; MG/1
1 TABLET ORAL DAILY PRN
Qty: 90 TABLET | Refills: 1 | Status: SHIPPED | OUTPATIENT
Start: 2024-09-12

## 2024-09-12 NOTE — TELEPHONE ENCOUNTER
Patient needs Cetirizine and Adderall sent to a different pharmacy as the [facility] location is unable to fill. Patient will call back with which pharmacy to send the prescriptions.

## 2024-09-13 RX ORDER — POLYMYXIN B SULFATE AND TRIMETHOPRIM 1; 10000 MG/ML; [USP'U]/ML
SOLUTION OPHTHALMIC
Qty: 10 ML | Refills: 0 | Status: SHIPPED | OUTPATIENT
Start: 2024-09-13

## 2024-09-13 RX ORDER — CETIRIZINE HYDROCHLORIDE 10 MG/1
10 TABLET ORAL DAILY
Qty: 90 TABLET | Refills: 3 | Status: SHIPPED | OUTPATIENT
Start: 2024-09-13

## 2024-09-13 RX ORDER — DEXTROAMPHETAMINE SACCHARATE, AMPHETAMINE ASPARTATE MONOHYDRATE, DEXTROAMPHETAMINE SULFATE AND AMPHETAMINE SULFATE 5; 5; 5; 5 MG/1; MG/1; MG/1; MG/1
20 CAPSULE, EXTENDED RELEASE ORAL EVERY MORNING
Qty: 90 CAPSULE | Refills: 0 | Status: SHIPPED | OUTPATIENT
Start: 2024-09-13

## 2024-09-18 DIAGNOSIS — F98.8 ATTENTION DEFICIT DISORDER (ADD) WITHOUT HYPERACTIVITY: ICD-10-CM

## 2024-09-18 RX ORDER — DEXTROAMPHETAMINE SACCHARATE, AMPHETAMINE ASPARTATE MONOHYDRATE, DEXTROAMPHETAMINE SULFATE AND AMPHETAMINE SULFATE 5; 5; 5; 5 MG/1; MG/1; MG/1; MG/1
20 CAPSULE, EXTENDED RELEASE ORAL EVERY MORNING
Qty: 90 CAPSULE | Refills: 0 | Status: SHIPPED | OUTPATIENT
Start: 2024-09-18

## 2024-10-30 ENCOUNTER — PRIOR AUTHORIZATION (OUTPATIENT)
Dept: FAMILY MEDICINE CLINIC | Facility: CLINIC | Age: 34
End: 2024-10-30

## 2024-10-30 NOTE — TELEPHONE ENCOUNTER
Pt called needing a PA on her tramadol 50mg tabs    KEY [id number]  DX G62.9    Faxed to plan.    [facility] RMA

## 2024-11-01 ENCOUNTER — OFFICE VISIT (OUTPATIENT)
Dept: FAMILY MEDICINE CLINIC | Facility: CLINIC | Age: 34
End: 2024-11-01

## 2024-11-01 VITALS
RESPIRATION RATE: 18 BRPM | SYSTOLIC BLOOD PRESSURE: 132 MMHG | TEMPERATURE: 97.2 F | HEIGHT: 64 IN | DIASTOLIC BLOOD PRESSURE: 90 MMHG | WEIGHT: 175.8 LBS | HEART RATE: 88 BPM | OXYGEN SATURATION: 97 % | BODY MASS INDEX: 30.01 KG/M2

## 2024-11-01 DIAGNOSIS — F98.8 ATTENTION DEFICIT DISORDER (ADD) WITHOUT HYPERACTIVITY: ICD-10-CM

## 2024-11-01 DIAGNOSIS — G62.9 NEUROPATHY: ICD-10-CM

## 2024-11-01 DIAGNOSIS — I10 PRIMARY HYPERTENSION: Primary | ICD-10-CM

## 2024-11-01 DIAGNOSIS — F41.9 ANXIETY: ICD-10-CM

## 2024-11-01 RX ORDER — TRAMADOL HYDROCHLORIDE 50 MG/1
50 TABLET ORAL DAILY PRN
Qty: 30 TABLET | Refills: 2 | Status: SHIPPED | OUTPATIENT
Start: 2024-11-01

## 2024-11-01 RX ORDER — CETIRIZINE HYDROCHLORIDE 10 MG/1
10 TABLET ORAL DAILY
Qty: 90 TABLET | Refills: 3 | Status: SHIPPED | OUTPATIENT
Start: 2024-11-01

## 2024-11-01 RX ORDER — DEXTROAMPHETAMINE SACCHARATE, AMPHETAMINE ASPARTATE MONOHYDRATE, DEXTROAMPHETAMINE SULFATE AND AMPHETAMINE SULFATE 5; 5; 5; 5 MG/1; MG/1; MG/1; MG/1
20 CAPSULE, EXTENDED RELEASE ORAL EVERY MORNING
Qty: 90 CAPSULE | Refills: 0 | Status: SHIPPED | OUTPATIENT
Start: 2024-11-01
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
## 2024-11-04 ENCOUNTER — PRIOR AUTHORIZATION (OUTPATIENT)
Dept: FAMILY MEDICINE CLINIC | Facility: CLINIC | Age: 34
End: 2024-11-04
Payer: MEDICAID

## 2024-11-04 NOTE — TELEPHONE ENCOUNTER
Received a fax stating the Amphetmine salts er 20mg caps are on backorder.  Can you advise?    [facility] MEENAKSHIA

## 2024-11-04 NOTE — TELEPHONE ENCOUNTER
PA denied-    Coverage is provided in situations where the dates and durations of therapy for at least one of the  following agents tried and failed: phentermine, benzphetamine, diethylpropion (IR/SR), or  phendimetrazine (IR/SR) have been provided. Coverage cannot be authorized at this time. Other  coverage conditions may apply    Please advise    [facility]

## 2024-11-04 NOTE — TELEPHONE ENCOUNTER
Spoke with pt. She is going to call around to several pharmacies to see if she can have medication sent and if they have in stock.

## 2024-11-19 ENCOUNTER — TELEPHONE (OUTPATIENT)
Dept: FAMILY MEDICINE CLINIC | Facility: CLINIC | Age: 34
End: 2024-11-19
Payer: MEDICAID

## 2024-11-19 NOTE — TELEPHONE ENCOUNTER
Please resend to new pharmacy.      LOV 11/1/24  NOV 2/3/25  LF 11/1/24-previous pharmacy out of stock    [facility]

## 2024-11-19 NOTE — TELEPHONE ENCOUNTER
Patient called to inform Dr. Morin that she has not been able to  her Adderall due to backorder. Patient found a pharmacy that has it in stock. She does not have any left.     please send to:    [facility] [id number] - [facility] [address] AT [facility] [address] ([facility] - [phone] [facility] [phone] FX [[id number]]

## 2024-11-20 DIAGNOSIS — F98.8 ATTENTION DEFICIT DISORDER (ADD) WITHOUT HYPERACTIVITY: ICD-10-CM

## 2024-11-20 RX ORDER — DEXTROAMPHETAMINE SACCHARATE, AMPHETAMINE ASPARTATE MONOHYDRATE, DEXTROAMPHETAMINE SULFATE AND AMPHETAMINE SULFATE 5; 5; 5; 5 MG/1; MG/1; MG/1; MG/1
20 CAPSULE, EXTENDED RELEASE ORAL EVERY MORNING
Qty: 90 CAPSULE | Refills: 0 | Status: SHIPPED | OUTPATIENT
Start: 2024-11-20

## 2025-01-04 DIAGNOSIS — G62.9 NEUROPATHY: ICD-10-CM

## 2025-01-06 RX ORDER — TRAMADOL HYDROCHLORIDE 50 MG/1
50 TABLET ORAL DAILY PRN
Qty: 30 TABLET | Refills: 2 | Status: SHIPPED | OUTPATIENT
Start: 2025-01-06

## 2025-01-08 NOTE — TELEPHONE ENCOUNTER
Received a PA for tramadol 50mg    KEY [id number]  DX G62.9    Approved through 7/6/25    [facility]

## 2025-01-15 DIAGNOSIS — F98.8 ATTENTION DEFICIT DISORDER (ADD) WITHOUT HYPERACTIVITY: ICD-10-CM

## 2025-01-17 DIAGNOSIS — F98.8 ATTENTION DEFICIT DISORDER (ADD) WITHOUT HYPERACTIVITY: ICD-10-CM

## 2025-01-17 RX ORDER — DEXTROAMPHETAMINE SACCHARATE, AMPHETAMINE ASPARTATE MONOHYDRATE, DEXTROAMPHETAMINE SULFATE AND AMPHETAMINE SULFATE 5; 5; 5; 5 MG/1; MG/1; MG/1; MG/1
20 CAPSULE, EXTENDED RELEASE ORAL EVERY MORNING
Qty: 90 CAPSULE | Refills: 0 | Status: SHIPPED | OUTPATIENT
Start: 2025-01-17 | End: 2025-01-17 | Stop reason: SDUPTHER

## 2025-01-20 DIAGNOSIS — G62.9 NEUROPATHY: ICD-10-CM

## 2025-01-20 RX ORDER — DEXTROAMPHETAMINE SACCHARATE, AMPHETAMINE ASPARTATE MONOHYDRATE, DEXTROAMPHETAMINE SULFATE AND AMPHETAMINE SULFATE 5; 5; 5; 5 MG/1; MG/1; MG/1; MG/1
20 CAPSULE, EXTENDED RELEASE ORAL EVERY MORNING
Qty: 90 CAPSULE | Refills: 0 | Status: SHIPPED | OUTPATIENT
Start: 2025-01-20

## 2025-01-20 RX ORDER — CYCLOBENZAPRINE HCL 10 MG
10 TABLET ORAL 3 TIMES DAILY PRN
Qty: 90 TABLET | Refills: 1 | Status: SHIPPED | OUTPATIENT
Start: 2025-01-20 | End: 2025-01-21 | Stop reason: SDUPTHER

## 2025-01-20 NOTE — TELEPHONE ENCOUNTER
LOV  11-1-2024  UOV  2-3-2025    1-  PROTOCOL   [facility]    Phone Message    May a detailed message be left on voicemail: yes     Reason for Call: Other: Patient looking to get a procedure with Dr Cruz as he's had in past.  Last seen 2021.  Please call to discuss.   Writer noticed permanent comment to no longer schedule this patient without approval from [facility].      Action Taken: Message routed to:  [facility] ([facility]): AE    Travel Screening: Not Applicable

## 2025-01-21 DIAGNOSIS — G62.9 NEUROPATHY: ICD-10-CM

## 2025-01-21 RX ORDER — CYCLOBENZAPRINE HCL 10 MG
10 TABLET ORAL 3 TIMES DAILY PRN
Qty: 90 TABLET | Refills: 1 | Status: SHIPPED | OUTPATIENT
Start: 2025-01-21

## 2025-03-24 DIAGNOSIS — F98.8 ATTENTION DEFICIT DISORDER (ADD) WITHOUT HYPERACTIVITY: ICD-10-CM

## 2025-03-24 DIAGNOSIS — G62.9 NEUROPATHY: ICD-10-CM

## 2025-03-24 DIAGNOSIS — Z30.8 ENCOUNTER FOR OTHER CONTRACEPTIVE MANAGEMENT: ICD-10-CM

## 2025-03-25 NOTE — TELEPHONE ENCOUNTER
Patient called and wants to know if the Adderrall could be called in as pills instead of capsules please.

## 2025-03-26 RX ORDER — CETIRIZINE HYDROCHLORIDE 10 MG/1
10 TABLET ORAL DAILY
Qty: 90 TABLET | Refills: 1 | Status: SHIPPED | OUTPATIENT
Start: 2025-03-26

## 2025-03-26 RX ORDER — MEDROXYPROGESTERONE ACETATE 150 MG/ML
150 INJECTION, SUSPENSION INTRAMUSCULAR
Qty: 1 ML | Refills: 0 | Status: SHIPPED | OUTPATIENT
Start: 2025-03-26

## 2025-03-26 RX ORDER — DEXTROAMPHETAMINE SACCHARATE, AMPHETAMINE ASPARTATE MONOHYDRATE, DEXTROAMPHETAMINE SULFATE AND AMPHETAMINE SULFATE 5; 5; 5; 5 MG/1; MG/1; MG/1; MG/1
20 CAPSULE, EXTENDED RELEASE ORAL EVERY MORNING
Qty: 90 CAPSULE | Refills: 0 | OUTPATIENT
Start: 2025-03-26

## 2025-03-26 RX ORDER — TRAMADOL HYDROCHLORIDE 50 MG/1
50 TABLET ORAL DAILY PRN
Qty: 30 TABLET | Refills: 2 | OUTPATIENT
Start: 2025-03-26

## 2025-04-30 ENCOUNTER — OFFICE VISIT (OUTPATIENT)
Dept: FAMILY MEDICINE CLINIC | Facility: CLINIC | Age: 35
End: 2025-04-30

## 2025-04-30 VITALS
WEIGHT: 171.2 LBS | TEMPERATURE: 98.2 F | BODY MASS INDEX: 29.23 KG/M2 | SYSTOLIC BLOOD PRESSURE: 130 MMHG | HEART RATE: 89 BPM | OXYGEN SATURATION: 99 % | DIASTOLIC BLOOD PRESSURE: 82 MMHG | HEIGHT: 64 IN

## 2025-04-30 DIAGNOSIS — Z79.899 LONG TERM USE OF DRUG: ICD-10-CM

## 2025-04-30 DIAGNOSIS — G62.9 NEUROPATHY: ICD-10-CM

## 2025-04-30 DIAGNOSIS — Z30.011 INITIATION OF OCP (BCP): ICD-10-CM

## 2025-04-30 DIAGNOSIS — I10 PRIMARY HYPERTENSION: Primary | ICD-10-CM

## 2025-04-30 DIAGNOSIS — F41.9 ANXIETY: ICD-10-CM

## 2025-04-30 DIAGNOSIS — F98.8 ATTENTION DEFICIT DISORDER (ADD) WITHOUT HYPERACTIVITY: ICD-10-CM

## 2025-04-30 PROCEDURE — 81025 URINE PREGNANCY TEST: CPT | Performed by: FAMILY MEDICINE

## 2025-04-30 PROCEDURE — 99214 OFFICE O/P EST MOD 30 MIN: CPT | Performed by: FAMILY MEDICINE

## 2025-04-30 RX ORDER — NORGESTIMATE AND ETHINYL ESTRADIOL 0.25-0.035
1 KIT ORAL DAILY
Qty: 28 TABLET | Refills: 12 | Status: SHIPPED | OUTPATIENT
Start: 2025-04-30

## 2025-04-30 RX ORDER — TRAMADOL HYDROCHLORIDE 50 MG/1
50 TABLET ORAL DAILY PRN
Qty: 30 TABLET | Refills: 2 | Status: SHIPPED | OUTPATIENT
Start: 2025-04-30

## 2025-04-30 RX ORDER — FLUOXETINE 10 MG/1
10 CAPSULE ORAL DAILY
Qty: 90 CAPSULE | Refills: 3 | Status: SHIPPED | OUTPATIENT
Start: 2025-04-30

## 2025-04-30 RX ORDER — DEXTROAMPHETAMINE SACCHARATE, AMPHETAMINE ASPARTATE MONOHYDRATE, DEXTROAMPHETAMINE SULFATE AND AMPHETAMINE SULFATE 5; 5; 5; 5 MG/1; MG/1; MG/1; MG/1
20 CAPSULE, EXTENDED RELEASE ORAL EVERY MORNING
Qty: 90 CAPSULE | Refills: 0 | Status: SHIPPED | OUTPATIENT
Start: 2025-04-30
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
## 2025-05-03 LAB
1OH-MIDAZOLAM UR QL SCN: NOT DETECTED NG/MG CREAT
6MAM UR QL SCN: NEGATIVE NG/ML
7AMINOCLONAZEPAM/CREAT UR: NOT DETECTED NG/MG CREAT
A-OH ALPRAZ/CREAT UR: NOT DETECTED NG/MG CREAT
A-OH-TRIAZOLAM/CREAT UR CFM: NOT DETECTED NG/MG CREAT
ALPRAZ/CREAT UR CFM: NOT DETECTED NG/MG CREAT
AMPHETAMINES UR QL SCN: NEGATIVE NG/ML
BARBITURATES UR QL SCN: NEGATIVE NG/ML
BENZODIAZ SCN METH UR: NEGATIVE
BUPRENORPHINE UR QL SCN: NEGATIVE
BUPRENORPHINE/CREAT UR: NOT DETECTED NG/MG CREAT
CLONAZEPAM/CREAT UR CFM: NOT DETECTED NG/MG CREAT
COCAINE+BZE UR QL SCN: NEGATIVE NG/ML
CREAT UR-MCNC: 34 MG/DL
DESALKYLFLURAZ/CREAT UR: NOT DETECTED NG/MG CREAT
DIAZEPAM/CREAT UR: NOT DETECTED NG/MG CREAT
FENTANYL CTO UR SCN-MCNC: NEGATIVE NG/ML
FENTANYL/CREAT UR: NOT DETECTED NG/MG CREAT
FLUNITRAZEPAM UR QL SCN: NOT DETECTED NG/MG CREAT
LORAZEPAM/CREAT UR: NOT DETECTED NG/MG CREAT
METHADONE UR QL SCN: NEGATIVE NG/ML
METHADONE+METAB UR QL SCN: NEGATIVE NG/ML
MIDAZOLAM/CREAT UR CFM: NOT DETECTED NG/MG CREAT
NORBUPRENORPHINE/CREAT UR: NOT DETECTED NG/MG CREAT
NORDIAZEPAM/CREAT UR: NOT DETECTED NG/MG CREAT
NORFENTANYL/CREAT UR: NOT DETECTED NG/MG CREAT
NORFLUNITRAZEPAM UR-MCNC: NOT DETECTED NG/MG CREAT
OPIATES UR SCN-MCNC: NEGATIVE NG/ML
OXAZEPAM/CREAT UR: NOT DETECTED NG/MG CREAT
OXYCODONE CTO UR SCN-MCNC: NEGATIVE NG/ML
PCP UR QL SCN: NEGATIVE NG/ML
PRESCRIBED MEDICATIONS: NORMAL
TAPENTADOL CTO UR SCN-MCNC: NEGATIVE NG/ML
TEMAZEPAM/CREAT UR: NOT DETECTED NG/MG CREAT
TRAMADOL UR QL SCN: NEGATIVE NG/ML

## 2025-05-16 RX ORDER — CETIRIZINE HYDROCHLORIDE 10 MG/1
10 TABLET ORAL DAILY
Qty: 90 TABLET | Refills: 1 | Status: SHIPPED | OUTPATIENT
Start: 2025-05-16

## 2025-05-16 NOTE — TELEPHONE ENCOUNTER
Rx Refill Note  Requested Prescriptions     Pending Prescriptions Disp Refills    cetirizine (zyrTEC) 10 MG tablet 90 tablet 1     Sig: Take 1 tablet by mouth Daily.      Last office visit with prescribing clinician: 4/30/2025   Last telemedicine visit with prescribing clinician: Visit date not found   Next office visit with prescribing clinician: Visit date not found                         Would you like a call back once the refill request has been completed: [] Yes [] No    If the office needs to give you a call back, can they leave a voicemail: [] Yes [] No    Becca Gibson MA  05/16/25, 09:02 EDT

## 2025-06-30 RX ORDER — CETIRIZINE HYDROCHLORIDE 10 MG/1
10 TABLET ORAL DAILY
Qty: 90 TABLET | Refills: 3 | Status: SHIPPED | OUTPATIENT
Start: 2025-06-30

## 2025-07-14 DIAGNOSIS — F98.8 ATTENTION DEFICIT DISORDER (ADD) WITHOUT HYPERACTIVITY: ICD-10-CM

## 2025-07-15 ENCOUNTER — TELEPHONE (OUTPATIENT)
Dept: FAMILY MEDICINE CLINIC | Facility: CLINIC | Age: 35
End: 2025-07-15

## 2025-07-15 RX ORDER — CETIRIZINE HYDROCHLORIDE 10 MG/1
10 TABLET ORAL DAILY
Qty: 90 TABLET | Refills: 3 | Status: SHIPPED | OUTPATIENT
Start: 2025-07-15

## 2025-07-15 RX ORDER — DEXTROAMPHETAMINE SACCHARATE, AMPHETAMINE ASPARTATE MONOHYDRATE, DEXTROAMPHETAMINE SULFATE AND AMPHETAMINE SULFATE 5; 5; 5; 5 MG/1; MG/1; MG/1; MG/1
20 CAPSULE, EXTENDED RELEASE ORAL EVERY MORNING
Qty: 90 CAPSULE | Refills: 0 | Status: SHIPPED | OUTPATIENT
Start: 2025-07-15

## 2025-07-21 RX ORDER — CETIRIZINE HYDROCHLORIDE 10 MG/1
10 TABLET ORAL DAILY
Qty: 90 TABLET | Refills: 3 | OUTPATIENT
Start: 2025-07-21

## 2025-08-15 ENCOUNTER — OFFICE VISIT (OUTPATIENT)
Dept: FAMILY MEDICINE CLINIC | Facility: CLINIC | Age: 35
End: 2025-08-15
Payer: COMMERCIAL

## 2025-08-15 VITALS
SYSTOLIC BLOOD PRESSURE: 124 MMHG | HEIGHT: 64 IN | HEART RATE: 91 BPM | WEIGHT: 171 LBS | OXYGEN SATURATION: 99 % | BODY MASS INDEX: 29.19 KG/M2 | DIASTOLIC BLOOD PRESSURE: 82 MMHG

## 2025-08-15 DIAGNOSIS — M94.0 COSTOCHONDRITIS: ICD-10-CM

## 2025-08-15 DIAGNOSIS — F41.9 ANXIETY: ICD-10-CM

## 2025-08-15 DIAGNOSIS — G62.9 NEUROPATHY: ICD-10-CM

## 2025-08-15 DIAGNOSIS — R68.2 DRY MOUTH: ICD-10-CM

## 2025-08-15 DIAGNOSIS — T78.40XD ALLERGY, SUBSEQUENT ENCOUNTER: ICD-10-CM

## 2025-08-15 DIAGNOSIS — F98.8 ATTENTION DEFICIT DISORDER (ADD) WITHOUT HYPERACTIVITY: ICD-10-CM

## 2025-08-15 DIAGNOSIS — K90.0 CELIAC DISEASE: ICD-10-CM

## 2025-08-15 DIAGNOSIS — L63.9 ALOPECIA AREATA: ICD-10-CM

## 2025-08-15 DIAGNOSIS — I10 PRIMARY HYPERTENSION: Primary | ICD-10-CM

## 2025-08-15 DIAGNOSIS — L20.84 INTRINSIC ECZEMA: ICD-10-CM

## 2025-08-15 PROBLEM — L65.9 BALDING: Status: RESOLVED | Noted: 2022-10-25 | Resolved: 2025-08-15

## 2025-08-15 PROBLEM — G47.33 OSA ON CPAP: Status: ACTIVE | Noted: 2025-08-15

## 2025-08-15 RX ORDER — CHLORCYCLIZINE HYDROCHLORIDE AND PSEUDOEPHEDRINE HYDROCHLORIDE 25; 60 MG/1; MG/1
TABLET ORAL
COMMUNITY
Start: 2021-01-21 | End: 2025-08-15

## 2025-08-15 RX ORDER — DEXTROAMPHETAMINE SACCHARATE, AMPHETAMINE ASPARTATE MONOHYDRATE, DEXTROAMPHETAMINE SULFATE AND AMPHETAMINE SULFATE 7.5; 7.5; 7.5; 7.5 MG/1; MG/1; MG/1; MG/1
30 CAPSULE, EXTENDED RELEASE ORAL EVERY MORNING
Qty: 30 CAPSULE | Refills: 0 | Status: SHIPPED | OUTPATIENT
Start: 2025-08-15

## 2025-08-15 RX ORDER — CYCLOBENZAPRINE HCL 10 MG
10 TABLET ORAL 2 TIMES DAILY PRN
Qty: 60 TABLET | Refills: 3 | Status: SHIPPED | OUTPATIENT
Start: 2025-08-15

## 2025-08-15 RX ORDER — DEXTROAMPHETAMINE SACCHARATE, AMPHETAMINE ASPARTATE MONOHYDRATE, DEXTROAMPHETAMINE SULFATE AND AMPHETAMINE SULFATE 5; 5; 5; 5 MG/1; MG/1; MG/1; MG/1
20 CAPSULE, EXTENDED RELEASE ORAL EVERY MORNING
Qty: 90 CAPSULE | Refills: 0 | Status: CANCELLED | OUTPATIENT
Start: 2025-08-15

## 2025-08-15 RX ORDER — CETIRIZINE HYDROCHLORIDE 10 MG/1
10 TABLET ORAL DAILY
Qty: 90 TABLET | Refills: 3 | Status: SHIPPED | OUTPATIENT
Start: 2025-08-15

## 2025-08-15 RX ORDER — TRAMADOL HYDROCHLORIDE 50 MG/1
50 TABLET ORAL DAILY PRN
Qty: 30 TABLET | Refills: 2 | Status: SHIPPED | OUTPATIENT
Start: 2025-08-15

## 2025-08-28 ENCOUNTER — TELEPHONE (OUTPATIENT)
Dept: FAMILY MEDICINE CLINIC | Facility: CLINIC | Age: 35
End: 2025-08-28
Payer: COMMERCIAL

## 2025-08-28 DIAGNOSIS — F98.8 ATTENTION DEFICIT DISORDER (ADD) WITHOUT HYPERACTIVITY: Primary | ICD-10-CM

## 2025-08-28 RX ORDER — DEXTROAMPHETAMINE SACCHARATE, AMPHETAMINE ASPARTATE, DEXTROAMPHETAMINE SULFATE AND AMPHETAMINE SULFATE 7.5; 7.5; 7.5; 7.5 MG/1; MG/1; MG/1; MG/1
30 TABLET ORAL DAILY
Qty: 30 TABLET | Refills: 0 | Status: SHIPPED | OUTPATIENT
Start: 2025-08-28

## (undated) DEVICE — COVER,MAYO STAND,STERILE: Brand: MEDLINE

## (undated) DEVICE — SUT GUT PLN 4/0 SC1 18IN 1824H

## (undated) DEVICE — TUBING, SUCTION, 1/4" X 20', STRAIGHT: Brand: MEDLINE INDUSTRIES, INC.

## (undated) DEVICE — KT ANTI FOG W/FLD AND SPNG

## (undated) DEVICE — 3M™ STERI-STRIP™ REINFORCED ADHESIVE SKIN CLOSURES, R1547, 1/2 IN X 4 IN (12 MM X 100 MM), 6 STRIPS/ENVELOPE: Brand: 3M™ STERI-STRIP™

## (undated) DEVICE — SUT GUT CHRM 5/0 P3 18IN 687G

## (undated) DEVICE — Device

## (undated) DEVICE — WIPE INST MEROCEL

## (undated) DEVICE — GLV SURG PREMIERPRO ORTHO LTX PF SZ7.5 BRN

## (undated) DEVICE — BLADE 1884004 TRICUT 5PK 4MM: Brand: TRICUT®

## (undated) DEVICE — FIRM 8CM: Brand: NASOPORE

## (undated) DEVICE — SUT ETHLN 4/0 PS2 PLSTC 1667G

## (undated) DEVICE — SUT GUT CHRM 4/0 PS2 18IN 1637G

## (undated) DEVICE — 3M™ STERI-STRIP™ REINFORCED ADHESIVE SKIN CLOSURES, R1546, 1/4 IN X 4 IN (6 MM X 100 MM), 10 STRIPS/ENVELOPE: Brand: 3M™ STERI-STRIP™

## (undated) DEVICE — PK ENT 40

## (undated) DEVICE — HDRST POSITIONING FM RND 2X9IN

## (undated) DEVICE — CODMAN® SURGICAL PATTIES 1/2" X 3" (1.27CM X 7.62CM): Brand: CODMAN®

## (undated) DEVICE — SPLINT 1524050 5PK PAIR DOYLE II AIRWAY: Brand: DOYLE II ™

## (undated) DEVICE — NDL HYPO PRECISIONGLIDE REG 25G 1 1/2

## (undated) DEVICE — VISUALIZATION SYSTEM: Brand: CLEARIFY